# Patient Record
Sex: MALE | Race: WHITE | Employment: OTHER | URBAN - METROPOLITAN AREA
[De-identification: names, ages, dates, MRNs, and addresses within clinical notes are randomized per-mention and may not be internally consistent; named-entity substitution may affect disease eponyms.]

---

## 2018-05-17 NOTE — PHARMACY-ADMISSION MEDICATION HISTORY
Medication reconciliation interview completed by pre-admitting nurse Jennifer Espana, reviewed by pharmacy. No further clarifications needed.       Prior to Admission medications    Medication Sig Last Dose Taking? Auth Provider   acetaminophen (TYLENOL) 325 MG tablet Take 2 tablets (650 mg) by mouth every 6 hours as needed for mild pain  Yes Deal, Shakir ANDUJAR MD   Lisinopril (ZESTRIL PO) Take 20 mg by mouth daily  Yes Reported, Patient   Pravastatin Sodium (PRAVACHOL PO) Take 10 mg by mouth every evening  Yes Reported, Patient

## 2018-05-21 ENCOUNTER — APPOINTMENT (OUTPATIENT)
Dept: GENERAL RADIOLOGY | Facility: CLINIC | Age: 83
DRG: 470 | End: 2018-05-21
Attending: ORTHOPAEDIC SURGERY
Payer: MEDICARE

## 2018-05-21 ENCOUNTER — ANESTHESIA EVENT (OUTPATIENT)
Dept: SURGERY | Facility: CLINIC | Age: 83
DRG: 470 | End: 2018-05-21
Payer: MEDICARE

## 2018-05-21 ENCOUNTER — ANESTHESIA (OUTPATIENT)
Dept: SURGERY | Facility: CLINIC | Age: 83
DRG: 470 | End: 2018-05-21
Payer: MEDICARE

## 2018-05-21 ENCOUNTER — HOSPITAL ENCOUNTER (INPATIENT)
Facility: CLINIC | Age: 83
LOS: 3 days | Discharge: HOME HEALTH | DRG: 470 | End: 2018-05-24
Attending: ORTHOPAEDIC SURGERY | Admitting: ORTHOPAEDIC SURGERY
Payer: MEDICARE

## 2018-05-21 DIAGNOSIS — Z96.641 STATUS POST TOTAL REPLACEMENT OF RIGHT HIP: Primary | ICD-10-CM

## 2018-05-21 DIAGNOSIS — R06.6 HICCUPS: ICD-10-CM

## 2018-05-21 PROBLEM — Z96.649 S/P TOTAL HIP ARTHROPLASTY: Status: ACTIVE | Noted: 2018-05-21

## 2018-05-21 LAB
ABO + RH BLD: NORMAL
ABO + RH BLD: NORMAL
BLD GP AB SCN SERPL QL: NORMAL
BLOOD BANK CMNT PATIENT-IMP: NORMAL
CREAT SERPL-MCNC: 1.38 MG/DL (ref 0.66–1.25)
GFR SERPL CREATININE-BSD FRML MDRD: 49 ML/MIN/1.7M2
HGB BLD-MCNC: 13.4 G/DL (ref 13.3–17.7)
POTASSIUM SERPL-SCNC: 4.4 MMOL/L (ref 3.4–5.3)
SPECIMEN EXP DATE BLD: NORMAL

## 2018-05-21 PROCEDURE — 27210995 ZZH RX 272: Performed by: ORTHOPAEDIC SURGERY

## 2018-05-21 PROCEDURE — 85018 HEMOGLOBIN: CPT | Performed by: ANESTHESIOLOGY

## 2018-05-21 PROCEDURE — 25000128 H RX IP 250 OP 636: Performed by: ANESTHESIOLOGY

## 2018-05-21 PROCEDURE — 71000012 ZZH RECOVERY PHASE 1 LEVEL 1 FIRST HR: Performed by: ORTHOPAEDIC SURGERY

## 2018-05-21 PROCEDURE — 25000128 H RX IP 250 OP 636: Performed by: NURSE ANESTHETIST, CERTIFIED REGISTERED

## 2018-05-21 PROCEDURE — 25000132 ZZH RX MED GY IP 250 OP 250 PS 637: Mod: GY | Performed by: ORTHOPAEDIC SURGERY

## 2018-05-21 PROCEDURE — 25000125 ZZHC RX 250: Performed by: ORTHOPAEDIC SURGERY

## 2018-05-21 PROCEDURE — 36000093 ZZH SURGERY LEVEL 4 1ST 30 MIN: Performed by: ORTHOPAEDIC SURGERY

## 2018-05-21 PROCEDURE — 37000008 ZZH ANESTHESIA TECHNICAL FEE, 1ST 30 MIN: Performed by: ORTHOPAEDIC SURGERY

## 2018-05-21 PROCEDURE — C1776 JOINT DEVICE (IMPLANTABLE): HCPCS | Performed by: ORTHOPAEDIC SURGERY

## 2018-05-21 PROCEDURE — 40000985 XR PELVIS AD HIP PORTABLE RIGHT 1 VIEW

## 2018-05-21 PROCEDURE — 27210794 ZZH OR GENERAL SUPPLY STERILE: Performed by: ORTHOPAEDIC SURGERY

## 2018-05-21 PROCEDURE — 25800025 ZZH RX 258: Performed by: ORTHOPAEDIC SURGERY

## 2018-05-21 PROCEDURE — 82565 ASSAY OF CREATININE: CPT | Performed by: ANESTHESIOLOGY

## 2018-05-21 PROCEDURE — 40000306 ZZH STATISTIC PRE PROC ASSESS II: Performed by: ORTHOPAEDIC SURGERY

## 2018-05-21 PROCEDURE — 86900 BLOOD TYPING SEROLOGIC ABO: CPT | Performed by: PHYSICIAN ASSISTANT

## 2018-05-21 PROCEDURE — 37000009 ZZH ANESTHESIA TECHNICAL FEE, EACH ADDTL 15 MIN: Performed by: ORTHOPAEDIC SURGERY

## 2018-05-21 PROCEDURE — 12000007 ZZH R&B INTERMEDIATE

## 2018-05-21 PROCEDURE — 36000063 ZZH SURGERY LEVEL 4 EA 15 ADDTL MIN: Performed by: ORTHOPAEDIC SURGERY

## 2018-05-21 PROCEDURE — 86850 RBC ANTIBODY SCREEN: CPT | Performed by: PHYSICIAN ASSISTANT

## 2018-05-21 PROCEDURE — 25000125 ZZHC RX 250: Performed by: NURSE ANESTHETIST, CERTIFIED REGISTERED

## 2018-05-21 PROCEDURE — 84132 ASSAY OF SERUM POTASSIUM: CPT | Performed by: ANESTHESIOLOGY

## 2018-05-21 PROCEDURE — 36415 COLL VENOUS BLD VENIPUNCTURE: CPT | Performed by: PHYSICIAN ASSISTANT

## 2018-05-21 PROCEDURE — A9270 NON-COVERED ITEM OR SERVICE: HCPCS | Mod: GY | Performed by: ORTHOPAEDIC SURGERY

## 2018-05-21 PROCEDURE — 40000985 XR HIP PORT RT 1 VW: Mod: TC

## 2018-05-21 PROCEDURE — 25000128 H RX IP 250 OP 636: Performed by: ORTHOPAEDIC SURGERY

## 2018-05-21 PROCEDURE — 25000128 H RX IP 250 OP 636: Performed by: PHYSICIAN ASSISTANT

## 2018-05-21 PROCEDURE — C1713 ANCHOR/SCREW BN/BN,TIS/BN: HCPCS | Performed by: ORTHOPAEDIC SURGERY

## 2018-05-21 PROCEDURE — 86901 BLOOD TYPING SEROLOGIC RH(D): CPT | Performed by: PHYSICIAN ASSISTANT

## 2018-05-21 PROCEDURE — 0SR9029 REPLACEMENT OF RIGHT HIP JOINT WITH METAL ON POLYETHYLENE SYNTHETIC SUBSTITUTE, CEMENTED, OPEN APPROACH: ICD-10-PCS | Performed by: ORTHOPAEDIC SURGERY

## 2018-05-21 DEVICE — IMP SHELL ACETABULUM HOWM 58MM 542-11-58G: Type: IMPLANTABLE DEVICE | Site: HIP | Status: FUNCTIONAL

## 2018-05-21 DEVICE — IMPLANTABLE DEVICE: Type: IMPLANTABLE DEVICE | Site: HIP | Status: FUNCTIONAL

## 2018-05-21 DEVICE — IMP ANCHOR ARTHREX CORKSCREW 6.5MM AR-1925SF: Type: IMPLANTABLE DEVICE | Site: HIP | Status: FUNCTIONAL

## 2018-05-21 DEVICE — IMP SCR BONE STRK TORX 6.5X25MM CAN 2030-6525-1: Type: IMPLANTABLE DEVICE | Site: HIP | Status: FUNCTIONAL

## 2018-05-21 RX ORDER — FENTANYL CITRATE 50 UG/ML
INJECTION, SOLUTION INTRAMUSCULAR; INTRAVENOUS PRN
Status: DISCONTINUED | OUTPATIENT
Start: 2018-05-21 | End: 2018-05-21

## 2018-05-21 RX ORDER — PROPOFOL 10 MG/ML
INJECTION, EMULSION INTRAVENOUS CONTINUOUS PRN
Status: DISCONTINUED | OUTPATIENT
Start: 2018-05-21 | End: 2018-05-21

## 2018-05-21 RX ORDER — ACETAMINOPHEN 325 MG/1
650 TABLET ORAL EVERY 4 HOURS PRN
Status: DISCONTINUED | OUTPATIENT
Start: 2018-05-24 | End: 2018-05-24 | Stop reason: HOSPADM

## 2018-05-21 RX ORDER — FENTANYL CITRATE 50 UG/ML
25-50 INJECTION, SOLUTION INTRAMUSCULAR; INTRAVENOUS
Status: DISCONTINUED | OUTPATIENT
Start: 2018-05-21 | End: 2018-05-21 | Stop reason: HOSPADM

## 2018-05-21 RX ORDER — HYDROMORPHONE HYDROCHLORIDE 1 MG/ML
.3-.5 INJECTION, SOLUTION INTRAMUSCULAR; INTRAVENOUS; SUBCUTANEOUS EVERY 10 MIN PRN
Status: DISCONTINUED | OUTPATIENT
Start: 2018-05-21 | End: 2018-05-21 | Stop reason: HOSPADM

## 2018-05-21 RX ORDER — ACETAMINOPHEN 325 MG/1
975 TABLET ORAL EVERY 8 HOURS
Status: COMPLETED | OUTPATIENT
Start: 2018-05-21 | End: 2018-05-24

## 2018-05-21 RX ORDER — ONDANSETRON 2 MG/ML
4 INJECTION INTRAMUSCULAR; INTRAVENOUS EVERY 30 MIN PRN
Status: DISCONTINUED | OUTPATIENT
Start: 2018-05-21 | End: 2018-05-21 | Stop reason: HOSPADM

## 2018-05-21 RX ORDER — HYDROMORPHONE HYDROCHLORIDE 1 MG/ML
0.2 INJECTION, SOLUTION INTRAMUSCULAR; INTRAVENOUS; SUBCUTANEOUS
Status: DISCONTINUED | OUTPATIENT
Start: 2018-05-21 | End: 2018-05-24 | Stop reason: HOSPADM

## 2018-05-21 RX ORDER — ONDANSETRON 2 MG/ML
INJECTION INTRAMUSCULAR; INTRAVENOUS PRN
Status: DISCONTINUED | OUTPATIENT
Start: 2018-05-21 | End: 2018-05-21

## 2018-05-21 RX ORDER — LIDOCAINE 40 MG/G
CREAM TOPICAL
Status: DISCONTINUED | OUTPATIENT
Start: 2018-05-21 | End: 2018-05-21 | Stop reason: HOSPADM

## 2018-05-21 RX ORDER — ONDANSETRON 4 MG/1
4 TABLET, ORALLY DISINTEGRATING ORAL EVERY 30 MIN PRN
Status: DISCONTINUED | OUTPATIENT
Start: 2018-05-21 | End: 2018-05-21 | Stop reason: HOSPADM

## 2018-05-21 RX ORDER — AMOXICILLIN 250 MG
2 CAPSULE ORAL 2 TIMES DAILY
Status: DISCONTINUED | OUTPATIENT
Start: 2018-05-21 | End: 2018-05-24 | Stop reason: HOSPADM

## 2018-05-21 RX ORDER — ONDANSETRON 2 MG/ML
4 INJECTION INTRAMUSCULAR; INTRAVENOUS EVERY 6 HOURS PRN
Status: DISCONTINUED | OUTPATIENT
Start: 2018-05-21 | End: 2018-05-24 | Stop reason: HOSPADM

## 2018-05-21 RX ORDER — HYDROXYZINE HYDROCHLORIDE 10 MG/1
10 TABLET, FILM COATED ORAL EVERY 6 HOURS PRN
Status: DISCONTINUED | OUTPATIENT
Start: 2018-05-21 | End: 2018-05-24 | Stop reason: HOSPADM

## 2018-05-21 RX ORDER — FENTANYL CITRATE 50 UG/ML
25-50 INJECTION, SOLUTION INTRAMUSCULAR; INTRAVENOUS EVERY 5 MIN PRN
Status: DISCONTINUED | OUTPATIENT
Start: 2018-05-21 | End: 2018-05-21 | Stop reason: HOSPADM

## 2018-05-21 RX ORDER — METOPROLOL TARTRATE 1 MG/ML
1-2 INJECTION, SOLUTION INTRAVENOUS EVERY 5 MIN PRN
Status: DISCONTINUED | OUTPATIENT
Start: 2018-05-21 | End: 2018-05-21 | Stop reason: HOSPADM

## 2018-05-21 RX ORDER — NALOXONE HYDROCHLORIDE 0.4 MG/ML
.1-.4 INJECTION, SOLUTION INTRAMUSCULAR; INTRAVENOUS; SUBCUTANEOUS
Status: DISCONTINUED | OUTPATIENT
Start: 2018-05-21 | End: 2018-05-24 | Stop reason: HOSPADM

## 2018-05-21 RX ORDER — ALBUTEROL SULFATE 0.83 MG/ML
2.5 SOLUTION RESPIRATORY (INHALATION) EVERY 4 HOURS PRN
Status: DISCONTINUED | OUTPATIENT
Start: 2018-05-21 | End: 2018-05-21 | Stop reason: HOSPADM

## 2018-05-21 RX ORDER — AMOXICILLIN 250 MG
1 CAPSULE ORAL 2 TIMES DAILY
Status: DISCONTINUED | OUTPATIENT
Start: 2018-05-21 | End: 2018-05-24 | Stop reason: HOSPADM

## 2018-05-21 RX ORDER — CEFAZOLIN SODIUM 2 G/100ML
2 INJECTION, SOLUTION INTRAVENOUS
Status: COMPLETED | OUTPATIENT
Start: 2018-05-21 | End: 2018-05-21

## 2018-05-21 RX ORDER — BUPIVACAINE HYDROCHLORIDE AND EPINEPHRINE 2.5; 5 MG/ML; UG/ML
30 INJECTION, SOLUTION EPIDURAL; INFILTRATION; INTRACAUDAL; PERINEURAL ONCE
Status: DISCONTINUED | OUTPATIENT
Start: 2018-05-21 | End: 2018-05-21 | Stop reason: HOSPADM

## 2018-05-21 RX ORDER — LIDOCAINE 40 MG/G
CREAM TOPICAL
Status: DISCONTINUED | OUTPATIENT
Start: 2018-05-21 | End: 2018-05-24 | Stop reason: HOSPADM

## 2018-05-21 RX ORDER — NALOXONE HYDROCHLORIDE 0.4 MG/ML
.1-.4 INJECTION, SOLUTION INTRAMUSCULAR; INTRAVENOUS; SUBCUTANEOUS
Status: DISCONTINUED | OUTPATIENT
Start: 2018-05-21 | End: 2018-05-21 | Stop reason: HOSPADM

## 2018-05-21 RX ORDER — HYDRALAZINE HYDROCHLORIDE 20 MG/ML
2.5-5 INJECTION INTRAMUSCULAR; INTRAVENOUS EVERY 10 MIN PRN
Status: DISCONTINUED | OUTPATIENT
Start: 2018-05-21 | End: 2018-05-21 | Stop reason: HOSPADM

## 2018-05-21 RX ORDER — SODIUM CHLORIDE, SODIUM LACTATE, POTASSIUM CHLORIDE, CALCIUM CHLORIDE 600; 310; 30; 20 MG/100ML; MG/100ML; MG/100ML; MG/100ML
INJECTION, SOLUTION INTRAVENOUS CONTINUOUS
Status: DISCONTINUED | OUTPATIENT
Start: 2018-05-21 | End: 2018-05-23

## 2018-05-21 RX ORDER — ONDANSETRON 4 MG/1
4 TABLET, ORALLY DISINTEGRATING ORAL EVERY 6 HOURS PRN
Status: DISCONTINUED | OUTPATIENT
Start: 2018-05-21 | End: 2018-05-24 | Stop reason: HOSPADM

## 2018-05-21 RX ORDER — MEPERIDINE HYDROCHLORIDE 50 MG/ML
12.5 INJECTION INTRAMUSCULAR; INTRAVENOUS; SUBCUTANEOUS
Status: DISCONTINUED | OUTPATIENT
Start: 2018-05-21 | End: 2018-05-21 | Stop reason: HOSPADM

## 2018-05-21 RX ORDER — SODIUM CHLORIDE, SODIUM LACTATE, POTASSIUM CHLORIDE, CALCIUM CHLORIDE 600; 310; 30; 20 MG/100ML; MG/100ML; MG/100ML; MG/100ML
INJECTION, SOLUTION INTRAVENOUS CONTINUOUS
Status: DISCONTINUED | OUTPATIENT
Start: 2018-05-21 | End: 2018-05-21 | Stop reason: HOSPADM

## 2018-05-21 RX ORDER — CEFAZOLIN SODIUM 1 G/3ML
1 INJECTION, POWDER, FOR SOLUTION INTRAMUSCULAR; INTRAVENOUS SEE ADMIN INSTRUCTIONS
Status: DISCONTINUED | OUTPATIENT
Start: 2018-05-21 | End: 2018-05-21 | Stop reason: HOSPADM

## 2018-05-21 RX ORDER — EPHEDRINE SULFATE 50 MG/ML
INJECTION, SOLUTION INTRAMUSCULAR; INTRAVENOUS; SUBCUTANEOUS PRN
Status: DISCONTINUED | OUTPATIENT
Start: 2018-05-21 | End: 2018-05-21

## 2018-05-21 RX ORDER — OXYCODONE HYDROCHLORIDE 5 MG/1
5 TABLET ORAL EVERY 4 HOURS PRN
Status: DISCONTINUED | OUTPATIENT
Start: 2018-05-21 | End: 2018-05-21

## 2018-05-21 RX ORDER — CEFAZOLIN SODIUM 2 G/100ML
2 INJECTION, SOLUTION INTRAVENOUS EVERY 8 HOURS
Status: COMPLETED | OUTPATIENT
Start: 2018-05-21 | End: 2018-05-22

## 2018-05-21 RX ORDER — OXYCODONE HYDROCHLORIDE 5 MG/1
5 TABLET ORAL
Status: DISCONTINUED | OUTPATIENT
Start: 2018-05-21 | End: 2018-05-24 | Stop reason: HOSPADM

## 2018-05-21 RX ADMIN — ONDANSETRON 4 MG: 2 INJECTION INTRAMUSCULAR; INTRAVENOUS at 14:10

## 2018-05-21 RX ADMIN — SODIUM CHLORIDE, POTASSIUM CHLORIDE, SODIUM LACTATE AND CALCIUM CHLORIDE: 600; 310; 30; 20 INJECTION, SOLUTION INTRAVENOUS at 14:51

## 2018-05-21 RX ADMIN — PHENYLEPHRINE HYDROCHLORIDE 100 MCG: 10 INJECTION, SOLUTION INTRAMUSCULAR; INTRAVENOUS; SUBCUTANEOUS at 14:31

## 2018-05-21 RX ADMIN — MIDAZOLAM 2 MG: 1 INJECTION INTRAMUSCULAR; INTRAVENOUS at 13:34

## 2018-05-21 RX ADMIN — SODIUM CHLORIDE, POTASSIUM CHLORIDE, SODIUM LACTATE AND CALCIUM CHLORIDE: 600; 310; 30; 20 INJECTION, SOLUTION INTRAVENOUS at 14:04

## 2018-05-21 RX ADMIN — PHENYLEPHRINE HYDROCHLORIDE 200 MCG: 10 INJECTION, SOLUTION INTRAMUSCULAR; INTRAVENOUS; SUBCUTANEOUS at 14:37

## 2018-05-21 RX ADMIN — PHENYLEPHRINE HYDROCHLORIDE 100 MCG: 10 INJECTION, SOLUTION INTRAMUSCULAR; INTRAVENOUS; SUBCUTANEOUS at 15:04

## 2018-05-21 RX ADMIN — CEFAZOLIN SODIUM 2 G: 2 INJECTION, SOLUTION INTRAVENOUS at 14:04

## 2018-05-21 RX ADMIN — Medication 10 MG: at 14:56

## 2018-05-21 RX ADMIN — SODIUM CHLORIDE, POTASSIUM CHLORIDE, SODIUM LACTATE AND CALCIUM CHLORIDE: 600; 310; 30; 20 INJECTION, SOLUTION INTRAVENOUS at 15:55

## 2018-05-21 RX ADMIN — VASOPRESSIN 2 UNITS: 20 INJECTION INTRAMUSCULAR; SUBCUTANEOUS at 15:28

## 2018-05-21 RX ADMIN — PHENYLEPHRINE HYDROCHLORIDE 100 MCG: 10 INJECTION, SOLUTION INTRAMUSCULAR; INTRAVENOUS; SUBCUTANEOUS at 14:48

## 2018-05-21 RX ADMIN — PHENYLEPHRINE HYDROCHLORIDE 200 MCG: 10 INJECTION, SOLUTION INTRAMUSCULAR; INTRAVENOUS; SUBCUTANEOUS at 14:56

## 2018-05-21 RX ADMIN — VASOPRESSIN 2 UNITS: 20 INJECTION INTRAMUSCULAR; SUBCUTANEOUS at 15:21

## 2018-05-21 RX ADMIN — Medication 5 MG: at 14:47

## 2018-05-21 RX ADMIN — SENNOSIDES AND DOCUSATE SODIUM 1 TABLET: 8.6; 5 TABLET ORAL at 19:30

## 2018-05-21 RX ADMIN — PHENYLEPHRINE HYDROCHLORIDE 100 MCG: 10 INJECTION, SOLUTION INTRAMUSCULAR; INTRAVENOUS; SUBCUTANEOUS at 14:23

## 2018-05-21 RX ADMIN — CEFAZOLIN SODIUM 2 G: 2 INJECTION, SOLUTION INTRAVENOUS at 22:26

## 2018-05-21 RX ADMIN — PHENYLEPHRINE HYDROCHLORIDE 100 MCG: 10 INJECTION, SOLUTION INTRAMUSCULAR; INTRAVENOUS; SUBCUTANEOUS at 15:28

## 2018-05-21 RX ADMIN — VASOPRESSIN 1 UNITS: 20 INJECTION INTRAMUSCULAR; SUBCUTANEOUS at 16:07

## 2018-05-21 RX ADMIN — CEFAZOLIN 1 G: 1 INJECTION, POWDER, FOR SOLUTION INTRAMUSCULAR; INTRAVENOUS at 16:02

## 2018-05-21 RX ADMIN — ACETAMINOPHEN 975 MG: 325 TABLET ORAL at 19:30

## 2018-05-21 RX ADMIN — FENTANYL CITRATE 100 MCG: 50 INJECTION, SOLUTION INTRAMUSCULAR; INTRAVENOUS at 13:34

## 2018-05-21 RX ADMIN — VASOPRESSIN 1 UNITS: 20 INJECTION INTRAMUSCULAR; SUBCUTANEOUS at 15:07

## 2018-05-21 RX ADMIN — SODIUM CHLORIDE, POTASSIUM CHLORIDE, SODIUM LACTATE AND CALCIUM CHLORIDE: 600; 310; 30; 20 INJECTION, SOLUTION INTRAVENOUS at 19:31

## 2018-05-21 RX ADMIN — VASOPRESSIN 2 UNITS: 20 INJECTION INTRAMUSCULAR; SUBCUTANEOUS at 15:39

## 2018-05-21 RX ADMIN — PROPOFOL 75 MCG/KG/MIN: 10 INJECTION, EMULSION INTRAVENOUS at 14:11

## 2018-05-21 RX ADMIN — OXYCODONE HYDROCHLORIDE 5 MG: 5 TABLET ORAL at 20:52

## 2018-05-21 RX ADMIN — PHENYLEPHRINE HYDROCHLORIDE 100 MCG: 10 INJECTION, SOLUTION INTRAMUSCULAR; INTRAVENOUS; SUBCUTANEOUS at 14:19

## 2018-05-21 RX ADMIN — Medication 10 MG: at 14:31

## 2018-05-21 ASSESSMENT — LIFESTYLE VARIABLES: TOBACCO_USE: 1

## 2018-05-21 NOTE — IP AVS SNAPSHOT
MRN:[id number]                      After Visit Summary   5/21/2018    Lobito David    MRN: [id number]           Thank you!     Thank you for choosing [facility] for your care. Our goal is always to provide you with excellent care. Hearing back from our patients is one way we can continue to improve our services. Please take a few minutes to complete the written survey that you may receive in the mail after you visit. If you would like to speak to someone directly about your visit please contact Patient Relations at [phone]. Thank you!          Patient Information     Date Of Birth          1935        Designated Caregiver       Most Recent Value    Caregiver    Will someone help with your care after discharge? yes    Name of designated caregiver Jennifer David    Phone number of caregiver [phone]    Caregiver address [place]      About your hospital stay     You were admitted on:  May 21, 2018 You last received care in the:  [facility]    You were discharged on:  May 24, 2018        Reason for your hospital stay       Right total hip replacement.                  Who to Call     For medical emergencies, please call 911.  For non-urgent questions about your medical care, please call your primary care provider or clinic, [phone]  For questions related to your surgery, please call your surgery clinic        Attending Provider     Provider Specialty    Dale Mcghee MD Orthopedics       Primary Care Provider Office Phone # Fax #    Mario Alberto Wang [phone]      After Care Instructions     Activity       Your activity upon discharge: ambulate in house with walker. No active abduction of the hip.            Diet       Follow this diet upon discharge: Regular            Wound care and dressings       Instructions to care for your wound at home: keep wound clean and dry. Keep dressing in place until follow-up office visit.                   Follow-up Appointments     Follow-up and recommended labs and tests        Follow-up with Fortunato Hogan PA-C in 2 weeks.                  Additional Services     Home Care OT Referral for Hospital Discharge       OT to eval and treat    Your provider has ordered home care - occupational therapy. If you have not been contacted within 2 days of your discharge please call the department phone number listed on the top of this document.            Home Care PT Referral for Hospital Discharge       PT to eval and treat    Your provider has ordered home care - physical therapy. If you have not been contacted within 2 days of your discharge please call the department phone number listed on the top of this document.            Home care nursing referral       RN skilled nursing visit. RN to assess vital signs and weight.    Your provider has ordered home care nursing services. If you have not been contacted within 2 days of your discharge please call the inpatient department phone number at [phone] .                  Further instructions from your care team       Your home care referral was sent to [facility].  If you haven't heard from them within the next 24-48 hours,  Please call them @ [phone]     Note:  You may not go to therapy appointments at the physical therapy building until home care has concluded.  PT will come to your home.            Return to clinic in10-14 days. Call [phone] to schedule or if you experience any problems before your scheduled appointment.      See general discharge instruction sheet for hips.  1. Do exercises as instructed by therapist.  2. Observe your hip precautions.  3. Walk daily increasing distance and time each day by a little.  4. Ice hip for swelling and discomfort.  5. May shower, inspect dressing daily for problems listed below   6.Notify your dentist or physician of your implant so you can get antibiotics before any dental work or any other invasive  "procedure (ie: colonoscopy).  7. Do not cross legs.      Aquacel dressing:  DO NOT CHANGE DRESSING DAILY.  Leave this dressing on until follow-up appointment with Orthopedic Surgeon.  Dressing is waterproof, can shower with it on, pat dry when done.  No soaking such as in tub baths, pools or hot tubs        While on narcotic pain medication, to prevent constipation:  1. Drink plenty of water to keep well hydrated   2. May take over the counter Colace or Senna (follow instructions on label)       Call your physician if: ([phone])   1. Persistent fever greater than 100 degrees with body chills or excessive sweating.  2. Increased/persistent redness, localized warmth, tenderness, drainage or swelling at incision site. Greater than 50% drainage on dressing.   3. Pain not controlled with oral pain medications, ice and rest.   4. No bowel movement in 3 days (may use Milk of Magnesia or other over the counter remedy).  5. Chest pain, shortness of breath, and/or calf pain with excessive swelling.  6. Generalized feeling of illness.  7. Any other questions or concerns related to your surgery/recovery.        Thank you for allowing [facility] to participate in your cares!!         Pending Results     No orders found from 5/19/2018 to 5/22/2018.            Statement of Approval     Ordered          05/22/18 0858  I have reviewed and agree with all the recommendations and orders detailed in this document.  EFFECTIVE NOW     Approved and electronically signed by:  Yefri Hogan PA             Admission Information     Date & Time Provider Department Dept. Phone    5/21/2018 Dale Mcghee MD [facility] [phone]      Your Vitals Were     Blood Pressure Pulse Temperature Respirations Height Weight    113/55 (BP Location: Left arm) 85 98.3  F (36.8  C) (Oral) 18 1.753 m (5' 9\") 86.2 kg (190 lb)    Pulse Oximetry BMI (Body Mass Index)                93% 28.06 kg/m2        " "  MyChart Information     [employer] lets you send messages to your doctor, view your test results, renew your prescriptions, schedule appointments and more. To sign up, go to [email or web address] . Click on \"Log in\" on the left side of the screen, which will take you to the Welcome page. Then click on \"Sign up Now\" on the right side of the page.     You will be asked to enter the access code listed below, as well as some personal information. Please follow the directions to create your username and password.     Your access code is: [email or web address]  Expires: 2018  8:22 AM     Your access code will  in 90 days. If you need help or a new code, please call your [place] clinic or [phone].        Care EveryWhere ID     This is your Care EveryWhere ID. This could be used by other organizations to access your [place] medical records  [id number]        Equal Access to Services     YVETTE LONG AH: Susan barraza Sofrancisca, watwilada hair, qaybta kaalmanora adecristela, penny vernon . So [facility] [phone].    ATENCIÓN: Si jazmine fink, tiene a sosa disposición servicios gratuitos de asistencia lingüística. Llame al [phone].    We comply with applicable federal civil rights laws and [place] laws. We do not discriminate on the basis of race, color, national origin, age, disability, sex, sexual orientation, or gender identity.               Review of your medicines      START taking        Dose / Directions    chlorproMAZINE 25 MG tablet   Commonly known as:  THORAZINE   Used for:  Hiccups        Dose:  25-50 mg   Take 1-2 tablets (25-50 mg) by mouth 3 times daily as needed (hiccups)   Quantity:  15 tablet   Refills:  0       oxyCODONE IR 5 MG tablet   Commonly known as:  ROXICODONE        Dose:  5 mg   Take 1 tablet (5 mg) by mouth every 3 hours as needed for other (pain control or improvement in physical function. Hold dose for analgesic side effects.)   Quantity:  60 tablet "   Refills:  0       rivaroxaban ANTICOAGULANT 10 MG Tabs tablet   Commonly known as:  XARELTO        Dose:  10 mg   Take 1 tablet (10 mg) by mouth daily   Quantity:  30 tablet   Refills:  0         CONTINUE these medicines which have NOT CHANGED        Dose / Directions    acetaminophen 325 MG tablet   Commonly known as:  TYLENOL   Used for:  S/P lumbar fusion        Dose:  650 mg   Take 2 tablets (650 mg) by mouth every 6 hours as needed for mild pain   Quantity:  40 tablet   Refills:  0       PRAVACHOL PO        Dose:  10 mg   Take 10 mg by mouth every evening   Refills:  0       ZESTRIL PO        Dose:  20 mg   Take 20 mg by mouth daily   Refills:  0            Where to get your medicines      These medications were sent to [place], [place] - [address] [facility]  [address] [facility] [address]     Phone:  [phone]     chlorproMAZINE 25 MG tablet         Some of these will need a paper prescription and others can be bought over the counter. Ask your nurse if you have questions.     Bring a paper prescription for each of these medications     oxyCODONE IR 5 MG tablet    rivaroxaban ANTICOAGULANT 10 MG Tabs tablet                Protect others around you: Learn how to safely use, store and throw away your medicines at www.disposemymeds.org.        Information about OPIOIDS     PRESCRIPTION OPIOIDS: WHAT YOU NEED TO KNOW   You have a prescription for an opioid (narcotic) pain medicine. Opioids can cause addiction. If you have a history of chemical dependency of any type, you are at a higher risk of becoming addicted to opioids. Only take this medicine after all other options have been tried. Take it for as short a time and as few doses as possible.     Do not:    Drive. If you drive while taking these medicines, you could be arrested for driving under the influence (DUI).    Operate heavy machinery    Do any other dangerous activities while taking these medicines.      Drink any alcohol while taking these medicines.      Take with any other medicines that contain acetaminophen. Read all labels carefully. Look for the word  acetaminophen  or  Tylenol.  Ask your pharmacist if you have questions or are unsure.    Store your pills in a secure place, locked if possible. We will not replace any lost or stolen medicine. If you don t finish your medicine, please throw away (dispose) as directed by your pharmacist. The [place] Pollution Control Agency has more information about safe disposal: https://www.pca.[facility].[place].us/living-green/managing-unwanted-medications    All opioids tend to cause constipation. Drink plenty of water and eat foods that have a lot of fiber, such as fruits, vegetables, prune juice, apple juice and high-fiber cereal. Take a laxative (Miralax, milk of magnesia, Colace, Senna) if you don t move your bowels at least every other day.              Medication List: This is a list of all your medications and when to take them. Check marks below indicate your daily home schedule. Keep this list as a reference.      Medications           Morning Afternoon Evening Bedtime As Needed    acetaminophen 325 MG tablet   Commonly known as:  TYLENOL   Take 2 tablets (650 mg) by mouth every 6 hours as needed for mild pain   Last time this was given:  975 mg on 5/24/2018 10:18 AM                                   chlorproMAZINE 25 MG tablet   Commonly known as:  THORAZINE   Take 1-2 tablets (25-50 mg) by mouth 3 times daily as needed (hiccups)   Last time this was given:  25 mg on 5/23/2018  8:53 PM                                   oxyCODONE IR 5 MG tablet   Commonly known as:  ROXICODONE   Take 1 tablet (5 mg) by mouth every 3 hours as needed for other (pain control or improvement in physical function. Hold dose for analgesic side effects.)   Last time this was given:  5 mg on 5/24/2018 11:49 AM                                   PRAVACHOL PO   Take 10 mg by mouth every evening                                    rivaroxaban ANTICOAGULANT 10 MG Tabs tablet   Commonly known as:  XARELTO   Take 1 tablet (10 mg) by mouth daily   Last time this was given:  10 mg on 5/24/2018  8:25 AM                                   ZESTRIL PO   Take 20 mg by mouth daily   Last time this was given:  20 mg on 5/24/2018  8:24 AM

## 2018-05-21 NOTE — BRIEF OP NOTE
[facility] Brief Operative Note    Pre-operative diagnosis: DJD   Post-operative diagnosis same   Procedure: Procedure(s):  Right Total Hip Arthroplasty with Antonio instrumentation   - Wound Class: I-Clean   Surgeon(s): Surgeon(s) and Role:     * Dale Mcghee MD - Primary     * Yefri Hogan PA   Estimated blood loss: 200 mL    Specimens: * No specimens in log *   Findings: Djd, abductor tear

## 2018-05-21 NOTE — ANESTHESIA POSTPROCEDURE EVALUATION
Patient: Lobito David    Procedure(s):  Right Total Hip Arthroplasty with Antonio instrumentation   - Wound Class: I-Clean    Diagnosis:DJD  Diagnosis Additional Information: Pre-operative diagnosis: DJD  Post-operative diagnosis same  Procedure: Procedure(s):  Right Total Hip Arthroplasty with [place] instrumentation   - Wound Class: I-Clean  Surgeon(s): Surgeon(s) and Role:     * Dale Mcghee MD - Primary     * lupis Lyle, YOBANI Regalado  Estimated blood loss: 200 mL                  Specimens: * No specimens in log *  Findings: Djd, abductor tear         Anesthesia Type:  Spinal    Note:  Anesthesia Post Evaluation    Patient location during evaluation: PACU  Patient participation: Able to fully participate in evaluation  Level of consciousness: awake  Pain management: adequate  Airway patency: patent  Cardiovascular status: acceptable  Respiratory status: acceptable  Hydration status: acceptable  PONV: none     Anesthetic complications: None          Last vitals:  Vitals:    05/21/18 1710 05/21/18 1714 05/21/18 1715   BP: 102/63  105/65   Pulse:      Resp: 17 19 15   Temp:      SpO2: 93% 95% 94%         Electronically Signed By: Frank Orellana MD  May 21, 2018  5:27 PM

## 2018-05-21 NOTE — ANESTHESIA PROCEDURE NOTES
Peripheral nerve/Neuraxial procedure note : intrathecal  Pre-Procedure  Performed by TINO LAO  Referred by JAZZ DELVALLE  Location: pre-op    Procedure Times:5/21/2018 1:34 AM and 5/21/2018 1:38 AM  Pre-Anesthestic Checklist: patient identified, IV checked, risks and benefits discussed, informed consent, monitors and equipment checked, pre-op evaluation and at physician/surgeon's request    Timeout  Correct Patient: Yes   Correct Procedure: Yes   Correct Site: Yes   Correct Laterality: N/A   Correct Position: Yes   Site Marked: N/A   .   Procedure Documentation  ASA 3  Diagnosis:Right hip djd.    Procedure:    Intrathecal.  Insertion Site:L3-4  (midline approach)      Patient Prep;mask, sterile gloves, povidone-iodine 7.5% surgical scrub.  .  Needle: Claudia tip Spinal Needle (gauge): 25  Spinal/LP Needle Length (inches): 3.5 # of attempts: 1 and # of redirects:  Introducer used .       Assessment/Narrative  Paresthesias: No.  .  .  clear CSF fluid removed . Comments:  15 mg bupivicaine, 100 mcg epinephrine

## 2018-05-21 NOTE — LETTER
Transition Communication Hand-off for Care Transitions to Next Level of Care Provider    Name: Lobito David  : 1935  MRN #: [id number]  Primary Care Provider: Mario Alberto Wang  Primary Care MD Name: Elizabeth   Primary Clinic: [place] [address] [facility] [address]  [place] [address]  Primary Care Clinic Name:  ([facility] )  Reason for Hospitalization:  DJD  S/P total hip arthroplasty  Admit Date/Time: 2018 12:40 PM  Discharge Date: 2018  Payor Source: Payor: [employer] / Plan: 51 Auto PLAN / Product Type: HMO /   Readmission Assessment Measure (SUGEY) Risk Score/category: LOW   Patient will receive the following: HOME CARE  [facility] RN/PT/OT     Key Recommendations:  Pt wife and pt have been through surgical recovery before with wife the main support and assist while pt has recovered from previous hip surgery and a back surgery   Pt was actually doing well with therapies but there was some hesitation and at one point TCU was recommended. Wife came in and joined pt for his  PT/OT sessions and it was decided that final recommendations would be for home PT/OT RN was added as pt had experienced some low BP's Pt discharging on Xarelto.     Allison Montgomery    AVS/Discharge Summary is the source of truth; this is a helpful guide for improved communication of patient story

## 2018-05-21 NOTE — ANESTHESIA PREPROCEDURE EVALUATION
Anesthesia Evaluation     . Pt has had prior anesthetic. Type: General    No history of anesthetic complications          ROS/MED HX    ENT/Pulmonary:     (+)tobacco use, Past use , . .    Neurologic:       Cardiovascular: Comment: AAA - 3.5 cm    (+) Dyslipidemia, hypertension----. : . . . :. .       METS/Exercise Tolerance:     Hematologic:     (+) History of blood clots (PE) -      Musculoskeletal:         GI/Hepatic:         Renal/Genitourinary:         Endo:         Psychiatric:         Infectious Disease:         Malignancy:         Other:                     Physical Exam      Airway   Mallampati: III  TM distance: >3 FB  Neck ROM: full    Dental     Cardiovascular   Rhythm and rate: regular and normal      Pulmonary    breath sounds clear to auscultation                    Anesthesia Plan      History & Physical Review  History and physical reviewed and following examination; no interval change.    ASA Status:  3 .    NPO Status:  > 8 hours    Plan for Spinal Maintenance will be Balanced.    PONV prophylaxis:  Ondansetron (or other 5HT-3) and Dexamethasone or Solumedrol       Postoperative Care  Postoperative pain management:  IV analgesics, Oral pain medications, Multi-modal analgesia and Neuraxial analgesia.      Consents  Anesthetic plan, risks, benefits and alternatives discussed with:  Patient..                          .

## 2018-05-21 NOTE — PLAN OF CARE
Pt. arrived to 6th floor on hospital bed. RA. Capnography monitoring. Oriented to room, unit, and use of call light. Will continue to monitor.

## 2018-05-21 NOTE — IP AVS SNAPSHOT
[facility]    [address]    [facility] [address]    Phone:  [phone]    Fax:  [phone]                                       After Visit Summary   5/21/2018    Lobito David    MRN: [id number]           After Visit Summary Signature Page     I have received my discharge instructions, and my questions have been answered. I have discussed any challenges I see with this plan with the nurse or doctor.    ..........................................................................................................................................  Patient/Patient Representative Signature      ..........................................................................................................................................  Patient Representative Print Name and Relationship to Patient    ..................................................               ................................................  Date                                            Time    ..........................................................................................................................................  Reviewed by Signature/Title    ...................................................              ..............................................  Date                                                            Time

## 2018-05-21 NOTE — ANESTHESIA CARE TRANSFER NOTE
Patient: Lobito David    Procedure(s):  Right Total Hip Arthroplasty with Antonio instrumentation   - Wound Class: I-Clean    Diagnosis: DJD  Diagnosis Additional Information: Pre-operative diagnosis: DJD  Post-operative diagnosis same  Procedure: Procedure(s):  Right Total Hip Arthroplasty with [place] instrumentation   - Wound Class: I-Clean  Surgeon(s): Surgeon(s) and Role:     * Dale Mcghee MD - Primary     * lupis Lyle, YOBANI Regalado  Estimated blood loss: 200 mL                  Specimens: * No specimens in log *  Findings: Djd, abductor tear         Anesthesia Type:   Spinal     Note:  Airway :Room Air  Patient transferred to:PACU  Handoff Report: Identifed the Patient, Identified the Reponsible Provider, Reviewed the pertinent medical history, Discussed the surgical course, Reviewed Intra-OP anesthesia mangement and issues during anesthesia, Set expectations for post-procedure period and Allowed opportunity for questions and acknowledgement of understanding      Vitals: (Last set prior to Anesthesia Care Transfer)    CRNA VITALS  5/21/2018 1616 - 5/21/2018 1654      5/21/2018             Resp Rate (observed): (!)  3                Electronically Signed By: Dean Dennis Severson, APRN CRNA  May 21, 2018  4:54 PM

## 2018-05-22 ENCOUNTER — APPOINTMENT (OUTPATIENT)
Dept: PHYSICAL THERAPY | Facility: CLINIC | Age: 83
DRG: 470 | End: 2018-05-22
Attending: ORTHOPAEDIC SURGERY
Payer: MEDICARE

## 2018-05-22 LAB
GLUCOSE SERPL-MCNC: 128 MG/DL (ref 70–99)
HGB BLD-MCNC: 10.8 G/DL (ref 13.3–17.7)

## 2018-05-22 PROCEDURE — 97161 PT EVAL LOW COMPLEX 20 MIN: CPT | Mod: GP | Performed by: PHYSICAL THERAPIST

## 2018-05-22 PROCEDURE — 12000000 ZZH R&B MED SURG/OB

## 2018-05-22 PROCEDURE — A9270 NON-COVERED ITEM OR SERVICE: HCPCS | Mod: GY | Performed by: INTERNAL MEDICINE

## 2018-05-22 PROCEDURE — 25000128 H RX IP 250 OP 636: Performed by: ORTHOPAEDIC SURGERY

## 2018-05-22 PROCEDURE — 97530 THERAPEUTIC ACTIVITIES: CPT | Mod: GP | Performed by: PHYSICAL THERAPIST

## 2018-05-22 PROCEDURE — 82947 ASSAY GLUCOSE BLOOD QUANT: CPT | Performed by: ORTHOPAEDIC SURGERY

## 2018-05-22 PROCEDURE — A9270 NON-COVERED ITEM OR SERVICE: HCPCS | Mod: GY | Performed by: ORTHOPAEDIC SURGERY

## 2018-05-22 PROCEDURE — 99232 SBSQ HOSP IP/OBS MODERATE 35: CPT | Performed by: INTERNAL MEDICINE

## 2018-05-22 PROCEDURE — 40000193 ZZH STATISTIC PT WARD VISIT: Performed by: PHYSICAL THERAPIST

## 2018-05-22 PROCEDURE — 25000132 ZZH RX MED GY IP 250 OP 250 PS 637: Mod: GY | Performed by: ORTHOPAEDIC SURGERY

## 2018-05-22 PROCEDURE — 97110 THERAPEUTIC EXERCISES: CPT | Mod: GP | Performed by: PHYSICAL THERAPIST

## 2018-05-22 PROCEDURE — 97116 GAIT TRAINING THERAPY: CPT | Mod: GP | Performed by: PHYSICAL THERAPIST

## 2018-05-22 PROCEDURE — 25000132 ZZH RX MED GY IP 250 OP 250 PS 637: Mod: GY | Performed by: INTERNAL MEDICINE

## 2018-05-22 PROCEDURE — 36415 COLL VENOUS BLD VENIPUNCTURE: CPT | Performed by: ORTHOPAEDIC SURGERY

## 2018-05-22 PROCEDURE — 99207 ZZC DOWN CODE DUE TO SUBSEQUENT EXAM: CPT | Performed by: INTERNAL MEDICINE

## 2018-05-22 PROCEDURE — 85018 HEMOGLOBIN: CPT | Performed by: ORTHOPAEDIC SURGERY

## 2018-05-22 RX ORDER — OXYCODONE HYDROCHLORIDE 5 MG/1
5 TABLET ORAL
Qty: 60 TABLET | Refills: 0 | Status: SHIPPED | OUTPATIENT
Start: 2018-05-22

## 2018-05-22 RX ORDER — CALCIUM CARBONATE 500 MG/1
1000 TABLET, CHEWABLE ORAL
Status: DISCONTINUED | OUTPATIENT
Start: 2018-05-22 | End: 2018-05-24 | Stop reason: HOSPADM

## 2018-05-22 RX ORDER — LISINOPRIL 20 MG/1
20 TABLET ORAL DAILY
Status: DISCONTINUED | OUTPATIENT
Start: 2018-05-22 | End: 2018-05-24 | Stop reason: HOSPADM

## 2018-05-22 RX ADMIN — OXYCODONE HYDROCHLORIDE 5 MG: 5 TABLET ORAL at 00:13

## 2018-05-22 RX ADMIN — LISINOPRIL 20 MG: 20 TABLET ORAL at 18:03

## 2018-05-22 RX ADMIN — SODIUM CHLORIDE, POTASSIUM CHLORIDE, SODIUM LACTATE AND CALCIUM CHLORIDE: 600; 310; 30; 20 INJECTION, SOLUTION INTRAVENOUS at 01:54

## 2018-05-22 RX ADMIN — OXYCODONE HYDROCHLORIDE 5 MG: 5 TABLET ORAL at 08:58

## 2018-05-22 RX ADMIN — OXYCODONE HYDROCHLORIDE 5 MG: 5 TABLET ORAL at 06:16

## 2018-05-22 RX ADMIN — HYDROXYZINE HYDROCHLORIDE 10 MG: 10 TABLET ORAL at 08:31

## 2018-05-22 RX ADMIN — OXYCODONE HYDROCHLORIDE 5 MG: 5 TABLET ORAL at 22:18

## 2018-05-22 RX ADMIN — OXYCODONE HYDROCHLORIDE 5 MG: 5 TABLET ORAL at 03:15

## 2018-05-22 RX ADMIN — ACETAMINOPHEN 975 MG: 325 TABLET ORAL at 11:43

## 2018-05-22 RX ADMIN — OXYCODONE HYDROCHLORIDE 5 MG: 5 TABLET ORAL at 18:03

## 2018-05-22 RX ADMIN — SENNOSIDES AND DOCUSATE SODIUM 2 TABLET: 8.6; 5 TABLET ORAL at 19:37

## 2018-05-22 RX ADMIN — RIVAROXABAN 10 MG: 10 TABLET, FILM COATED ORAL at 08:58

## 2018-05-22 RX ADMIN — ACETAMINOPHEN 975 MG: 325 TABLET ORAL at 01:51

## 2018-05-22 RX ADMIN — HYDROXYZINE HYDROCHLORIDE 10 MG: 10 TABLET ORAL at 01:08

## 2018-05-22 RX ADMIN — ACETAMINOPHEN 975 MG: 325 TABLET ORAL at 18:03

## 2018-05-22 RX ADMIN — CALCIUM CARBONATE (ANTACID) CHEW TAB 500 MG 1000 MG: 500 CHEW TAB at 08:57

## 2018-05-22 RX ADMIN — CALCIUM CARBONATE (ANTACID) CHEW TAB 500 MG 1000 MG: 500 CHEW TAB at 13:38

## 2018-05-22 RX ADMIN — OXYCODONE HYDROCHLORIDE 5 MG: 5 TABLET ORAL at 11:54

## 2018-05-22 RX ADMIN — SENNOSIDES AND DOCUSATE SODIUM 1 TABLET: 8.6; 5 TABLET ORAL at 08:58

## 2018-05-22 RX ADMIN — CEFAZOLIN SODIUM 2 G: 2 INJECTION, SOLUTION INTRAVENOUS at 06:16

## 2018-05-22 NOTE — PLAN OF CARE
Problem: Patient Care Overview  Goal: Plan of Care/Patient Progress Review  A&Ox4. VSS. Ambulates Ax1 w/ Walker and GB. Palmer d/c'd this am, has voided adequately since then. +BS/gas. Dressing CDI. Taking PRN oxycodone for pain control. Hemovac in place and draining. Will continue to monitor.

## 2018-05-22 NOTE — PLAN OF CARE
Problem: Patient Care Overview  Goal: Plan of Care/Patient Progress Review  Outcome: Improving  A&Ox4. VSS. RA. Numbness to BLE from spinal block.  Dressing CDI. Capnography monitoring. Palmer catheter patent w/ adequate amt. of output. Hemovac patent. Up w/ Ax2, gait belt, walker. Able to stand up at bedside, but unable to ambulate d/t spinal block and numbness present in BLE. BS activex4, denies passing flatus, tolerating clear liquids. Pain managed w/ scheduled po Tylenol. Prn po Oxycodone and IV Diluadid also available. Will continue to monitor.

## 2018-05-22 NOTE — PROGRESS NOTES
05/22/18 0800   Quick Adds   Type of Visit Initial PT Evaluation   Living Environment   Lives With spouse   Living Arrangements house   Home Accessibility stairs to enter home   Number of Stairs to Enter Home 2   Number of Stairs Within Home 1  (1 into sunken living room, 1 platform to bedroom)   Stair Railings at Home none   Living Environment Comment single level except one platform down into living room or bedroom   Self-Care   Usual Activity Tolerance good   Current Activity Tolerance good   Regular Exercise yes   Activity/Exercise Type walking   Exercise Amount/Frequency 45 mins;daily   Equipment Currently Used at Home (owns a SEC, fww, seat built in shower)   Activity/Exercise/Self-Care Comment indep ADL/IADL   Functional Level Prior   Ambulation 0-->independent   Transferring 0-->independent   Toileting 0-->independent   Bathing 0-->independent   Dressing 0-->independent   Eating 0-->independent   Communication 0-->understands/communicates without difficulty   Swallowing 0-->swallows foods/liquids without difficulty   Cognition 0 - no cognition issues reported   Fall history within last six months no   Which of the above functional risks had a recent onset or change? ambulation   General Information   Onset of Illness/Injury or Date of Surgery - Date 05/21/18   Referring Physician Dale Mcghee   Patient/Family Goals Statement Home when able   Pertinent History of Current Problem (include personal factors and/or comorbidities that impact the POC) POD#1 s/p R LEYDA 2/2 DJD, and torn abductor muscle found introperatively   Precautions/Limitations oxygen therapy device and L/min;right hip precautions  (anterolateral approach prec.Walker x 6 weeks/no hip ABD ex)   Weight-Bearing Status - RLE weight-bearing as tolerated   General Info Comments Per ortho, due to ABD muscle tear, pt must use fww x 6 weeks, and avoid active hip ABD during healing phase in addition to Ant hip prec.    Cognitive Status Examination    Orientation orientation to person, place and time   Level of Consciousness alert   Follows Commands and Answers Questions 100% of the time;able to follow multistep instructions   Personal Safety and Judgment intact   Memory intact   Pain Assessment   Patient Currently in Pain Yes, see Vital Sign flowsheet  (7-8 at rest)   Integumentary/Edema   Integumentary/Edema Comments OP site   Range of Motion (ROM)   ROM Comment hip flex to 65 tolerated on R   Strength   Strength Comments NT , functional . Good isometric quality,   Bed Mobility   Bed Mobility Comments Cues, min A OP leg, HOB elevated, SR   Transfer Skills   Transfer Comments Min A, cues   Gait   Gait Comments On capno, so a few paces bed>chair, close CGA, cues for step sequence. Fww.    Sensory Examination   Sensory Perception Comments pt now has feeling in feet/legs.    Modality Interventions   Planned Modality Interventions Cryotherapy   General Therapy Interventions   Planned Therapy Interventions bed mobility training;gait training;ROM;strengthening;transfer training;risk factor education;home program guidelines;progressive activity/exercise   Intervention Comments precaution reminders   Clinical Impression   Criteria for Skilled Therapeutic Intervention yes, treatment indicated   PT Diagnosis difficulty with walking, post op hip aftercare   Influenced by the following impairments pain, weakness, stiffness, supplemental 02   Functional limitations due to impairments A with functional mobiltiy needed, limited gait tolerance, need for AD.   Clinical Presentation Stable/Uncomplicated   Clinical Presentation Rationale per observation, pain high all night, pain fluctuating this am   Clinical Decision Making (Complexity) Low complexity   Therapy Frequency` 2 times/day   Predicted Duration of Therapy Intervention (days/wks) 2-3 days   Anticipated Equipment Needs at Discharge (owns a fww, may have OT DME needs)   Anticipated Discharge Disposition Home with  "Assist  (spouse can supervise/very min A)   Risk & Benefits of therapy have been explained Yes   Patient, Family & other staff in agreement with plan of care Yes   [facility] TM \"6 Clicks\"   2016, Trustees of [facility], under license to [employer].  All rights reserved.   6 Clicks Short Forms Basic Mobility Inpatient Short Form   [facility] AM-PAC  \"6 Clicks\" V.2 Basic Mobility Inpatient Short Form   1. Turning from your back to your side while in a flat bed without using bedrails? 3 - A Little   2. Moving from lying on your back to sitting on the side of a flat bed without using bedrails? 3 - A Little   3. Moving to and from a bed to a chair (including a wheelchair)? 3 - A Little   4. Standing up from a chair using your arms (e.g., wheelchair, or bedside chair)? 3 - A Little   5. To walk in hospital room? 3 - A Little   6. Climbing 3-5 steps with a railing? 2 - A Lot   Basic Mobility Raw Score (Score out of 24.Lower scores equate to lower levels of function) 17   Total Evaluation Time   Total Evaluation Time (Minutes) 15     "

## 2018-05-22 NOTE — OP NOTE
Procedure Date: 05/21/2018      DATE OF SURGERY:  05/21/2018      PREOPERATIVE DIAGNOSIS:  Right hip primary osteoarthritis.      POSTOPERATIVE DIAGNOSIS:  Right hip primary osteoarthritis.      PROCEDURE:  Right total hip arthroplasty.      SURGEON:  Dale Mcghee MD      ASSISTANT:  Mihai Hogan PA-C      ANESTHESIA:  Spinal and local.      ESTIMATED BLOOD LOSS:  Less than 200 mL.      COMPLICATIONS:  None apparent.      DRAINS:  One Hemovac.       COMPONENTS:  Size 58 acetabular shell, size 5 stem, 36+0 mm metal head, Trident Crossfire 36 mm 0 degree insert.      INDICATIONS:  Kenny is an 82-year-old male who has had longstanding right hip pain.  He attempted conservative management and injections which failed to relieve his symptoms.  Based on his ongoing pain, options were reviewed.  Based on his grade 4 changes on x-ray, he elected for total hip replacement.  Risks, benefits and alternatives were discussed and consent signed.      DESCRIPTION OF PROCEDURE:  Mr. David was brought to the operating room and  placed supine on the table.  A spinal anesthesia was administered.  He was placed in lateral decubitus with his right hip up and then prepped and draped in the usual sterile fashion for a total hip replacement.  After a timeout confirming the appropriate limb, a standard lateral incision directly over the trochanter was undertaken with sharp dissection.  The IT band was identified.  This was split and East-West was placed.  The bursal tissue was excised, noting he did have some partial thickness tearing with some atrophy of his abductors.  The anterior third was then taken off and a capsulotomy was performed.  The hip was then easily dislocated.  The femoral head was osteotomized.  We placed retractors along the acetabulum and he had a large peripheral osteophyte with some spurring.  He had obvious grade 4 changes in the acetabulum.  We used a combination of a rongeur and osteotome to remove the bone spurs  from around the acetabulum.  We identified the medial wall and removed some of the soft tissue from the pulvinar.  We then began reaming.  We started with a 44.  We reamed sequentially up to a 58.  We trialled.  We were very pleased with the position as we were at about 40 degrees of inclination and about 10 degrees of anteversion.  We then used a 59 just to touch the outer rim.  We then impacted the 58 cup and it went down without any problems.  We placed two 25 mm screws to help secure the cup and we had excellent purchase.  We then irrigated, placed the final poly using the Trident Crossfire for a 36 mm head.  We were again pleased with the position of the cup so just a 0-degree insert was used.  We then turned our attention to the femur.  We began broaching, starting with a 0 and broached all the way up to a 5.  We felt we had a good fit, so we did obtain an intraoperative x-ray which showed excellent position of our cup and it looked like the size 5 was an excellent size for him.  This was impacted without difficulty.  We did do a trial and felt the +0 gave us equal leg lengths and excellent stability both anteriorly and posteriorly.  We then did a 3-minute Betadine soak, impacted the final head in position and located the hip again without difficulty, did a final irrigation.  We then closed the abductors using interrupted #1 Vicryl.  We also used a 6.5 corkscrew which was doubly loaded to reinforce our repair.  We also used a #5 Ethibond through drill holes to reinforce our repair as we were somewhat concerned about some of the atrophic abductors, which was consistent with his problem on his other left hip.  We then irrigated copiously, placed a drain, and closed in layers.  Sterile dressings were applied.  He was brought to PACU in stable condition.  Needle and sponge counts correct.      PLAN:  The patient will be on a walker for 6 weeks.  Weightbearing as tolerates.  He will be admitted for pain control.   He was given Ancef 2 grams within an hour of the procedure.  This will be discontinued within 24 hours.  He will be placed on Xarelto and pneumoboots for DVT prophylaxis.  He has had a history of a blood clot in the past so this will be extended prophylaxis.         SHAYLA SCHULZ MD             D: 2018   T: 2018   MT: NTS      Name:     GURPREET RAI   MRN:      [id number]        Account:        [id number]   :      1935           Procedure Date: 2018      Document: [id number]       cc: Mario Alberto Wang MD

## 2018-05-22 NOTE — PLAN OF CARE
"Problem: Patient Care Overview  Goal: Plan of Care/Patient Progress Review  PT: PT evaluation completed, tx initiated POD#1 s/p R LEYDA and hip Abductor muscle tear repair. Pt has hx of L hip abductor muscle tear repair a couple yrs ago. At baseline, pt lives in home w/ spouse with 2 stairs in from garage, no railing, and 2 different platform steps in room to access \" sunken\" rooms in the home. Indep  ADL/IADL at baseline. Pt walking 45 min/day for ex up until  3 weeks ago when pain began.   Discharge Planner PT   Patient plan for discharge: Home w/ spouse POD 2 or 3 pending progress  Current status: Pain 7/10 with activity. Slept poorly due to pain. Pt no longer reporting numbness in feet/legs . Precautions reviewed:Anterolateral hip precautions as well as using a fww x 6 weeks post op, and avoid active hip ABD/ ex due to hip abductor muscle repair intraoperatively. Bed mob: Min A op leg, min A at torso w/ HOB up, SR used. Transfers with min A and cues using fww. Capno on, so only a few steps taken bed>chair with fww, CGA, effortful for pt. Up to chair w/ 1 hr recommended as darin. Ice. WBAT. Plan BID thru tomorrow/am Thurs.    Barriers to return to prior living situation: none once stairs are mastered, should be able to meet all goals in next day or so.   Recommendations for discharge:  Home w/ HEP, spouse supervision as needed. Will need to use fww x 6 weeks per ortho  Rationale for recommendations: Skilled PT while hospitalized to meet all goals, and HEP to progress gait and strength so pt can be Mod I with functional mobility in the home and community setting.        Entered by: Jvoita Parker 05/22/2018 12:43 PM           "

## 2018-05-22 NOTE — PLAN OF CARE
Problem: Hip Arthroplasty (Total, Partial) (Adult)  Goal: Signs and Symptoms of Listed Potential Problems Will be Absent, Minimized or Managed (Hip Arthroplasty)  Signs and symptoms of listed potential problems will be absent, minimized or managed by discharge/transition of care (reference Hip Arthroplasty (Total, Partial) (Adult) CPG).   Outcome: No Change  A&Ox4, up Ax2 to dangle  LDA: PIV infusing, IV ancef  Vitals: 2L/NC applied, ?LEELEE  Pain: difficulty controlling, PRN atarax & oxy, sched tylenol, some relief @ 0400   CMS intact  Aquacel CDI, ice applied  Hemovac CDI  Palmer patent  Regular diet this am  Followed by Ortho  Plan: PT & OT consults, control pain  Will continue to monitor

## 2018-05-22 NOTE — PLAN OF CARE
Problem: Patient Care Overview  Goal: Plan of Care/Patient Progress Review  Discharge Planner PT   Patient plan for discharge: Home w/ spouse , HEP tomorrow pm or Thurs am pending progress.  Current status: Pain 6/10, Min/Mod A bed mobility. Min A transfers, gait with fww 150' max with vc needed for proper gait pattern. Tolerating ex. Sleepy.   Barriers to return to prior living situation: Will practice stairs tomorrow, work on bed mobility, pt should be able to make all goals by tomorrow pm or Thurs am.  Recommendations for discharge: Home w/ spouse, HEP  Rationale for recommendations: Skilled PT while hospitalized to achieve all goals, improve strength, reinforce precautions, HEP should  be sufficient upon dc. SPouse supportive and able to Supervise and provide min A PRN.        Entered by: Jovita Parker 05/22/2018 4:13 PM

## 2018-05-22 NOTE — PROGRESS NOTES
[facility]    Orthopedics Progress Note     Assessment & Plan   Lobito David is a 82 year old male who was admitted on 5/21/2018 for right LEYDA. Plan is to continue therapy, pain control, VTE prophylaxis with Xarelto. Full time walker use for ambulation and no active abduction of the hip. Anticipate discharge home in 2 days. Patient will continue Xarelto at home due to history of PE.       # Pain Assessment:  Current Pain Score 5/22/2018   Patient currently in pain? -   Pain score (0-10) 6   Pain location -   Pain descriptors -       Thaddaeus Roni Tewes    Interval History   Patient had significant pain control issues overnight, but states his pain is much better now. He is tolerating his medication regimen well.     Physical Exam   Temp: 97.2  F (36.2  C) Temp src: Oral BP: 127/71 Pulse: 78 Heart Rate: 82 Resp: 16 SpO2: 100 % O2 Device: Nasal cannula Oxygen Delivery: 2 LPM  Vitals:    05/14/18 1200 05/21/18 1253   Weight: 86.6 kg (190 lb 14.4 oz) 86.2 kg (190 lb)     Vital Signs with Ranges  Temp:  [93.7  F (34.3  C)-98.8  F (37.1  C)] 97.2  F (36.2  C)  Pulse:  [78] 78  Heart Rate:  [76-89] 82  Resp:  [9-21] 16  BP: ()/(47-94) 127/71  SpO2:  [91 %-100 %] 100 %  I/O last 3 completed shifts:  In: 5865 [P.O.:1360; I.V.:4505]  Out: 2235 [Urine:1675; Drains:360; Blood:200]    Dressing shows scant blood. Calves soft. NV intact distally.     Medications     lactated ringers 125 mL/hr at 05/22/18 0154       acetaminophen  975 mg Oral Q8H     rivaroxaban ANTICOAGULANT  10 mg Oral Daily     senna-docusate  1 tablet Oral BID    Or     senna-docusate  2 tablet Oral BID     sodium chloride (PF)  3 mL Intracatheter Q8H       Data     Recent Labs  Lab 05/22/18  0607 05/21/18  1308   HGB 10.8* 13.4   POTASSIUM  --  4.4   CR  --  1.38*   *  --

## 2018-05-22 NOTE — CONSULTS
"[facility]  Hospitalist consult note    Bernabe Bhagat MD 05/22/2018  Text Page      Reason for Stay (Diagnosis): Total hip arthroplasty         Assessment and Plan:      Summary of Stay: Lobito David is a 82 year old male admitted on 5/21/2018 after right total hip arthroplasty.  He has a previous history of hypertension, dyslipidemia, pulmonary embolus after spine surgery, spinal fusion, 3.5 cm abdominal aortic aneurysm, shoulder surgery, 2 knee surgeries and an episode of diverticulitis.    Problem List:   1. Postop right total hip arthroplasty  -Had quite a bit of pain last night but feeling better today.  -Continue physical therapy and pain control per orthopedics    2.  Hypertension-blood pressure okay, had one low reading this morning.  -Resume lisinopril with parameters    3.  Dyslipidemia  -Hold statin for now    4.  Previous history of pulmonary embolus after spine surgery  -Agree with systemic anticoagulation with Xarelto      # Pain Assessment:  Current Pain Score 5/22/2018   Patient currently in pain? yes   Pain score (0-10) 4   Pain location Hip   Pain descriptors Aching   See above      DVT Prophylaxis: Xarelto  Code Status: Full Code:            Interval History (Subjective):      Feels okay.  Pain much improved from yesterday.                  Physical Exam:      Last Vital Signs:  /76 (BP Location: Left arm)  Pulse 78  Temp 98.3  F (36.8  C) (Oral)  Resp 16  Ht 1.753 m (5' 9\")  Wt 86.2 kg (190 lb)  SpO2 96%  BMI 28.06 kg/m2      Vital signs reviewed  General:  Alert, calm, NAD  CV: regular rate and rhythm, no murmurs or rubs  Lungs:  Clear to ascultation bilaterally, normal respiratory effort  Abdomen:  Soft, nontender, nondistended, no masses, normal bowel sounds  Extremities:  No edema  Neuro: normal strength and sensation in his legs.    Psychiatric:  Mood and affect within normal limits             Medications:      All current medications were reviewed " with changes reflected in problem list.         Data:      All new lab and imaging data was reviewed.   Labs:  Hemoglobin 10.8

## 2018-05-23 ENCOUNTER — APPOINTMENT (OUTPATIENT)
Dept: PHYSICAL THERAPY | Facility: CLINIC | Age: 83
DRG: 470 | End: 2018-05-23
Attending: ORTHOPAEDIC SURGERY
Payer: MEDICARE

## 2018-05-23 ENCOUNTER — APPOINTMENT (OUTPATIENT)
Dept: OCCUPATIONAL THERAPY | Facility: CLINIC | Age: 83
DRG: 470 | End: 2018-05-23
Attending: ORTHOPAEDIC SURGERY
Payer: MEDICARE

## 2018-05-23 LAB
GLUCOSE SERPL-MCNC: 148 MG/DL (ref 70–99)
HGB BLD-MCNC: 9.7 G/DL (ref 13.3–17.7)

## 2018-05-23 PROCEDURE — 97110 THERAPEUTIC EXERCISES: CPT | Mod: GP | Performed by: PHYSICAL THERAPY ASSISTANT

## 2018-05-23 PROCEDURE — 85018 HEMOGLOBIN: CPT | Performed by: ORTHOPAEDIC SURGERY

## 2018-05-23 PROCEDURE — 97165 OT EVAL LOW COMPLEX 30 MIN: CPT | Mod: GO

## 2018-05-23 PROCEDURE — 40000193 ZZH STATISTIC PT WARD VISIT: Performed by: PHYSICAL THERAPY ASSISTANT

## 2018-05-23 PROCEDURE — 40000133 ZZH STATISTIC OT WARD VISIT

## 2018-05-23 PROCEDURE — 25000132 ZZH RX MED GY IP 250 OP 250 PS 637: Mod: GY | Performed by: INTERNAL MEDICINE

## 2018-05-23 PROCEDURE — 12000000 ZZH R&B MED SURG/OB

## 2018-05-23 PROCEDURE — 82947 ASSAY GLUCOSE BLOOD QUANT: CPT | Performed by: ORTHOPAEDIC SURGERY

## 2018-05-23 PROCEDURE — 97530 THERAPEUTIC ACTIVITIES: CPT | Mod: GP | Performed by: PHYSICAL THERAPY ASSISTANT

## 2018-05-23 PROCEDURE — 36415 COLL VENOUS BLD VENIPUNCTURE: CPT | Performed by: ORTHOPAEDIC SURGERY

## 2018-05-23 PROCEDURE — 97116 GAIT TRAINING THERAPY: CPT | Mod: GP | Performed by: PHYSICAL THERAPY ASSISTANT

## 2018-05-23 PROCEDURE — 25000132 ZZH RX MED GY IP 250 OP 250 PS 637: Mod: GY | Performed by: ORTHOPAEDIC SURGERY

## 2018-05-23 PROCEDURE — 99232 SBSQ HOSP IP/OBS MODERATE 35: CPT | Performed by: INTERNAL MEDICINE

## 2018-05-23 PROCEDURE — A9270 NON-COVERED ITEM OR SERVICE: HCPCS | Mod: GY | Performed by: ORTHOPAEDIC SURGERY

## 2018-05-23 PROCEDURE — 97535 SELF CARE MNGMENT TRAINING: CPT | Mod: GO

## 2018-05-23 RX ORDER — CHLORPROMAZINE HYDROCHLORIDE 25 MG/1
25 TABLET, FILM COATED ORAL ONCE
Status: COMPLETED | OUTPATIENT
Start: 2018-05-23 | End: 2018-05-23

## 2018-05-23 RX ADMIN — OXYCODONE HYDROCHLORIDE 5 MG: 5 TABLET ORAL at 08:00

## 2018-05-23 RX ADMIN — SENNOSIDES AND DOCUSATE SODIUM 1 TABLET: 8.6; 5 TABLET ORAL at 20:03

## 2018-05-23 RX ADMIN — SENNOSIDES AND DOCUSATE SODIUM 2 TABLET: 8.6; 5 TABLET ORAL at 08:00

## 2018-05-23 RX ADMIN — ACETAMINOPHEN 975 MG: 325 TABLET ORAL at 10:13

## 2018-05-23 RX ADMIN — ACETAMINOPHEN 975 MG: 325 TABLET ORAL at 02:05

## 2018-05-23 RX ADMIN — CHLORPROMAZINE HYDROCHLORIDE 25 MG: 25 TABLET, SUGAR COATED ORAL at 20:53

## 2018-05-23 RX ADMIN — RIVAROXABAN 10 MG: 10 TABLET, FILM COATED ORAL at 08:01

## 2018-05-23 RX ADMIN — OXYCODONE HYDROCHLORIDE 5 MG: 5 TABLET ORAL at 11:57

## 2018-05-23 RX ADMIN — ACETAMINOPHEN 975 MG: 325 TABLET ORAL at 18:59

## 2018-05-23 RX ADMIN — CALCIUM CARBONATE (ANTACID) CHEW TAB 500 MG 1000 MG: 500 CHEW TAB at 11:57

## 2018-05-23 NOTE — PLAN OF CARE
Problem: Patient Care Overview  Goal: Plan of Care/Patient Progress Review  Outcome: Improving  A&Ox4. Slight temp of 99.5F, encouraged use of IS, temp down to 98.9F at recheck. RA. CMS intact. Dressing CDI. Voiding in adequate amounts. Hemovac patent. Up w/ Ax1, gait belt, walker. BS activex4, passing flatus, tolerating regular diet well, fair appetite. Pain managed w/ scheduled po Tylenol and prn po Oxycodone. IV Diluadid also available. Will continue to monitor.

## 2018-05-23 NOTE — PLAN OF CARE
Problem: Patient Care Overview  Goal: Plan of Care/Patient Progress Review  Outcome: No Change  Pt A&Ox4, VSS, denies pain. Right hip dressing C/D/I.  CMS intact.  HV to right hip. Per pt, he states he's had hiccups for 2 days.  Hearing aids at bedside.  +4 BS, last BM 5/21/18.  Voiding in urinals at bedside.  Up 1 assist gb/ww.  Plan:  Pain control, OT/PT, SW following.  Will continue to monitor.

## 2018-05-23 NOTE — PLAN OF CARE
"Problem: Patient Care Overview  Goal: Plan of Care/Patient Progress Review  OT- eval completed, tx initiated. Pt is an 83 yo male seen POD #2 s/p R LEYDA. [place] with 2 stairs to enter. He is IND in all ADL/IADLs, including driving. He had a recent fall in March resulting in loss of consciousness and states he never sought medical assistance following the fall.     Discharge Planner OT   Patient plan for discharge: home with spouse A  Current status: Able to verbalize 2 hip precautions with cuing. Educated in hip precautions, provided handout. Supine>sit EOB CGA with heavy VCs and extra time needed; noted increased difficulty during task completion. Sit>stand CGA with 2ww. Ambulated to bathroom CGA with 2ww, noted slow gait speed and \"hop step\" at times, VCs to correct. Pt stood sinkside to complete 4 g/h tasks close SBA. Noted pt with difficulty problem solving with novel items; as pt unable to open soap bottle despite verbal cuing, needing Max A to open bottle. Pt with intact fine motor coordination. Discussed home toilet set-up, after instruction, pt completed sit<>stand comfort height toilet MIN A with use of grab bars; extra time needed. Discussed recommendation for RTS with B rails. Ambulated back to sit in bedside chair, CGA with 2ww; stand>sit CGA. Alarm armed at end of session, all needs within reach.  Barriers to return to prior living situation: pain, hip precautions, cognition  Recommendations for discharge: TCU; recommend AE/DME upon return home  Rationale for recommendations: Pt to benefit from continued IP OT services to increase independence in ADL and functional mobility tasks prior to safe d/c home. Currently functioning below baseline in ADL and functional mobility tasks d/t post-op status and possible cognitive deficits       Entered by: Monique Castellon 05/23/2018 8:52 AM           "

## 2018-05-23 NOTE — PLAN OF CARE
Problem: Patient Care Overview  Goal: Plan of Care/Patient Progress Review  Outcome: Improving  Day RN  Bp soft in am patient asymptomatic, bp meds held per parameter. Drinking and eating well, passing gas, hiccups through out the day , declined medication, heart burn around lunch time, tums given.   Minimal pain taking prn oxycodone before pt along with scheduled tylenol.   Voiding well.  hemovac dc dressing is CDI. CMS+  Moving well. Up with A1 gait belt and walker.   Dc to home thursday

## 2018-05-23 NOTE — PLAN OF CARE
Problem: Patient Care Overview  Goal: Plan of Care/Patient Progress Review  Discharge Planner PT   Patient plan for discharge: Home w/ spouse , HEP tomorrow pm or Thurs am pending progress.  Current status: Pt transfers supine to sit with mod to min assist with both LE and min assist with supine to sit. Pt transfers sit to/from stand with CGA with vcs for hand placement. Pt transfers bed to/from chair with ww with min assist. Pt amb 35' with ww with min assist with improved gait pattern. Note pt to amb with step thru gait pattern with vcs for pt to look up.   Barriers to return to prior living situation: Will practice stairs tomorrow, work on bed mobility, pt should be able to make all goals by tomorrow pm or Thurs am.  Recommendations for discharge: Collaborated with PT and recommend TCU at this time.   Rationale for recommendations: Skilled PT while hospitalized to achieve all goals, improve strength, reinforce precautions, HEP should  be sufficient upon dc. SPouse supportive and able to Supervise and provide min A PRN.        Entered by: Erica Powell 05/23/2018 9:04 AM

## 2018-05-23 NOTE — PROGRESS NOTES
05/23/18 0740   Quick Adds   Type of Visit Initial Occupational Therapy Evaluation   Living Environment   Lives With spouse   Living Arrangements house   Home Accessibility stairs to enter home  (walk-in shower)   Number of Stairs to Enter Home 2   Number of Stairs Within Home 1  (sunken living room)   Transportation Available car;family or friend will provide   Living Environment Comment Pt lives with his wife in a farm home with 2 stairs to enter. His wife is able to drive as needed.   Self-Care   Dominant Hand right   Usual Activity Tolerance good   Current Activity Tolerance moderate   Regular Exercise yes   Activity/Exercise Type walking   Exercise Amount/Frequency 45 mins;daily   Equipment Currently Used at Home walker, rolling;cane, straight  (built in shower bench)   Activity/Exercise/Self-Care Comment States he walks daily but hasn't been able to in past 3 weeks d/t hip pain.    Functional Level Prior   Ambulation 0-->independent   Transferring 0-->independent   Toileting 0-->independent   Bathing 0-->independent   Dressing 0-->independent   Eating 0-->independent   Communication 0-->understands/communicates without difficulty   Swallowing 0-->swallows foods/liquids without difficulty   Cognition 0 - no cognition issues reported   Fall history within last six months yes   Number of times patient has fallen within last six months 1  (fell on ice getting the mail, lost consciousness)   Which of the above functional risks had a recent onset or change? transferring;toileting;bathing;dressing   Prior Functional Level Comment Pt was IND in all ADLs PTA   General Information   Onset of Illness/Injury or Date of Surgery - Date 05/21/18   Referring Physician Dale Mcghee MD   Patient/Family Goals Statement to return home    Additional Occupational Profile Info/Pertinent History of Current Problem Pt is a 82 year old male seen POD #2 s/p right total hip arthroplasty.  He has a previous history of hypertension,  dyslipidemia, pulmonary embolus after spine surgery, spinal fusion, 3.5 cm abdominal aortic aneurysm, shoulder surgery, 2 knee surgeries and an episode of diverticulitis.   Precautions/Limitations right hip precautions;fall precautions  (no active abduction; full extension)   Weight-Bearing Status - RLE weight-bearing as tolerated   General Info Comments Alert, pleasant. Wears hearing aids   Cognitive Status Examination   Orientation orientation to person, place and time   Level of Consciousness alert   Able to Follow Commands WNL/WFL   Personal Safety (Cognitive) WNL/WFL   Organization/Problem Solving Problem solving impaired   Cognitive Comment Pt states he had a fall in March resulting in loss of consciousness; states he never seeked medical help following fall. Noted during eval that problem solving with novel items was impaired.   Visual Perception   Visual Perception Wears glasses  (readers)   Sensory Examination   Sensory Comments denies n/t    Pain Assessment   Patient Currently in Pain No   Integumentary/Edema   Integumentary/Edema Comments R LE not observed   Range of Motion (ROM)   ROM Comment B UE WFL   Strength   Strength Comments B UE WFL    Hand Strength   Hand Strength Comments strong bilaterally   Coordination   Upper Extremity Coordination No deficits were identified   Mobility   Bed Mobility Comments Supine>sit EOB close SBA with heavy cuing and extra time needed; noted increased difficulty to complete task   Transfer Skills   Transfer Comments CGA for all functional mobility within room   Transfer Skill: Sit to Stand   Level of [place]: Sit/Stand contact guard   Physical Assist/Nonphysical Assist: Sit/Stand verbal cues;1 person assist   Transfer Skill: Sit to Stand weight-bearing as tolerated   Assistive Device for Transfer: Sit/Stand rolling walker   Transfer Skill: Toilet Transfer   Level of [place]: Toilet minimum assist (75% patients effort)   Physical Assist/Nonphysical Assist:  "Toilet verbal cues;1 person assist   Weight-Bearing Restrictions: Toilet weight-bearing as tolerated   Assistive Device rolling walker;grab bars   Balance   Balance Comments general unsteadiness as expected post-op   Grooming   Level of [place]: Grooming stand-by assist   Physical Assist/Nonphysical Assist: Grooming verbal cues   Assistive Device (close SBA required)   Instrumental Activities of Daily Living (IADL)   Previous Responsibilities yardwork;shopping;driving;medication management;finances  (pet care)   IADL Comments [place], manages all the yardwork (has a riding ). His wife manages all other IADLs. Pt has a daughter that lives nearby.   Activities of Daily Living Analysis   Impairments Contributing to Impaired Activities of Daily Living pain;post surgical precautions   General Therapy Interventions   Planned Therapy Interventions ADL retraining;progressive activity/exercise   Clinical Impression   Criteria for Skilled Therapeutic Interventions Met yes, treatment indicated   OT Diagnosis Decreased ADLs/IADLs   Influenced by the following impairments pain;post surgical precautions   Assessment of Occupational Performance 3-5 Performance Deficits   Identified Performance Deficits ADL/IADLs: dressing, bathing, toileting, g/h, driving   Clinical Decision Making (Complexity) Low complexity   Therapy Frequency daily   Predicted Duration of Therapy Intervention (days/wks) 3 days   Anticipated Equipment Needs at Discharge bath sponge;reacher;shower chair;raised toilet seat   Anticipated Discharge Disposition Home;Other (see comments)  (with AE/DME)   Risks and Benefits of Treatment have been explained. Yes   Patient, Family & other staff in agreement with plan of care Yes   Clinical Impression Comments Agreeable to therapy; has had previous L LEYDA   [facility] AM-PAC TM \"6 Clicks\"   2016, Trustees of [facility], under license to [employer].  All rights reserved.   6 " "Clicks Short Forms Daily Activity Inpatient Short Form   [facility] AM-PAC  \"6 Clicks\" Daily Activity Inpatient Short Form   1. Putting on and taking off regular lower body clothing? 3 - A Little   2. Bathing (including washing, rinsing, drying)? 3 - A Little   3. Toileting, which includes using toilet, bedpan or urinal? 3 - A Little   4. Putting on and taking off regular upper body clothing? 4 - None   5. Taking care of personal grooming such as brushing teeth? 3 - A Little   6. Eating meals? 4 - None   Daily Activity Raw Score (Score out of 24.Lower scores equate to lower levels of function) 20   Total Evaluation Time   Total Evaluation Time (Minutes) 10     "

## 2018-05-23 NOTE — PROGRESS NOTES
"[facility]  Hospitalist Progress Note    Bernabe Bhagat MD 05/23/2018  Text Page      Reason for Stay (Diagnosis): Right total hip arthroplasty         Assessment and Plan:      Summary of Stay: Lobito David is a 82 year old male admitted on 5/21/2018 after right total hip arthroplasty.  He has a previous history of hypertension, dyslipidemia, pulmonary embolus after spine surgery, spinal fusion, 3.5 cm abdominal aortic aneurysm, shoulder surgery, 2 knee surgeries and an episode of diverticulitis.     Problem List:   1. Postop right total hip arthroplasty  -Had quite a bit of pain postoperatively but now improving quite a bit..  -Continue physical therapy and pain control per orthopedics     2.  Hypertension-blood pressure has been a bit low periodically likely related to pain medications and blood loss.  -Resumed lisinopril with parameters     3.  Dyslipidemia  -Hold statin for now     4.  Previous history of pulmonary embolus after spine surgery  -Agree with systemic anticoagulation with Xarelto      # Pain Assessment:  Current Pain Score 5/23/2018   Patient currently in pain? sleeping: patient not able to self report   Pain score (0-10) -   Pain location -   Pain descriptors -   Pain well controlled per patient.  Orthopedics managing.      DVT Prophylaxis: Xarelto  Code Status: Full              Interval History (Subjective):      Pain continues to improve.  Otherwise feels well.                  Physical Exam:      Last Vital Signs:  BP 98/62  Pulse 85  Temp 96.5  F (35.8  C) (Oral)  Resp 20  Ht 1.753 m (5' 9\")  Wt 86.2 kg (190 lb)  SpO2 94%  BMI 28.06 kg/m2      Vital signs reviewed  General:  Alert, calm, NAD  CV: regular rate and rhythm, no murmurs or rubs  Lungs:  Clear to ascultation bilaterally, normal respiratory effort  Abdomen:  Soft, nontender, nondistended, no masses, normal bowel sounds  Extremities:  No edema  Neuro: normal strength and sensation in his legs.  "   Psychiatric:  Mood and affect within normal limits           Medications:      All current medications were reviewed with changes reflected in problem list.         Data:      All new lab and imaging data was reviewed.   Labs:    Hemoglobin 9.7

## 2018-05-23 NOTE — CONSULTS
Care Transitions Team: Following for CC, discharge planning, and disposition.        Per chart review MD has consulted [facility] for potential for disposition concerns.     Per PT  POD 1 assessment   Discharge Planner PT   Patient plan for discharge: Home w/ spouse , HEP tomorrow pm or Thurs am pending progress.  Current status: Pain 6/10, Min/Mod A bed mobility. Min A transfers, gait with fww 150' max with vc needed for proper gait pattern. Tolerating ex. Sleepy.   Barriers to return to prior living situation: Will practice stairs tomorrow, work on bed mobility, pt should be able to make all goals by tomorrow pm or Thurs am.  Recommendations for discharge: Home w/ spouse, HEP  Rationale for recommendations: Skilled PT while hospitalized to achieve all goals, improve strength, reinforce precautions, HEP should  be sufficient upon dc. SPouse supportive and able to Supervise and provide min A PRN.     Not anticapting any concerns with Discharge dispos. Please re- consult [facility] if POC changes.     Allison Montgomery RN, BSN, [facility]  Care Transitions Team  [phone]

## 2018-05-24 ENCOUNTER — APPOINTMENT (OUTPATIENT)
Dept: OCCUPATIONAL THERAPY | Facility: CLINIC | Age: 83
DRG: 470 | End: 2018-05-24
Attending: ORTHOPAEDIC SURGERY
Payer: MEDICARE

## 2018-05-24 ENCOUNTER — APPOINTMENT (OUTPATIENT)
Dept: PHYSICAL THERAPY | Facility: CLINIC | Age: 83
DRG: 470 | End: 2018-05-24
Attending: ORTHOPAEDIC SURGERY
Payer: MEDICARE

## 2018-05-24 VITALS
HEART RATE: 85 BPM | TEMPERATURE: 98.3 F | WEIGHT: 190 LBS | HEIGHT: 69 IN | DIASTOLIC BLOOD PRESSURE: 55 MMHG | OXYGEN SATURATION: 93 % | RESPIRATION RATE: 18 BRPM | SYSTOLIC BLOOD PRESSURE: 113 MMHG | BODY MASS INDEX: 28.14 KG/M2

## 2018-05-24 LAB
CREAT SERPL-MCNC: 1.6 MG/DL (ref 0.66–1.25)
GFR SERPL CREATININE-BSD FRML MDRD: 42 ML/MIN/1.7M2

## 2018-05-24 PROCEDURE — 97535 SELF CARE MNGMENT TRAINING: CPT | Mod: GO

## 2018-05-24 PROCEDURE — 82565 ASSAY OF CREATININE: CPT | Performed by: INTERNAL MEDICINE

## 2018-05-24 PROCEDURE — 97530 THERAPEUTIC ACTIVITIES: CPT | Mod: GP | Performed by: PHYSICAL THERAPY ASSISTANT

## 2018-05-24 PROCEDURE — 25000132 ZZH RX MED GY IP 250 OP 250 PS 637: Mod: GY | Performed by: ORTHOPAEDIC SURGERY

## 2018-05-24 PROCEDURE — 97110 THERAPEUTIC EXERCISES: CPT | Mod: GP | Performed by: PHYSICAL THERAPY ASSISTANT

## 2018-05-24 PROCEDURE — 40000193 ZZH STATISTIC PT WARD VISIT: Performed by: PHYSICAL THERAPY ASSISTANT

## 2018-05-24 PROCEDURE — 40000133 ZZH STATISTIC OT WARD VISIT

## 2018-05-24 PROCEDURE — 25000132 ZZH RX MED GY IP 250 OP 250 PS 637: Mod: GY | Performed by: INTERNAL MEDICINE

## 2018-05-24 PROCEDURE — 36415 COLL VENOUS BLD VENIPUNCTURE: CPT | Performed by: INTERNAL MEDICINE

## 2018-05-24 PROCEDURE — A9270 NON-COVERED ITEM OR SERVICE: HCPCS | Mod: GY | Performed by: INTERNAL MEDICINE

## 2018-05-24 PROCEDURE — A9270 NON-COVERED ITEM OR SERVICE: HCPCS | Mod: GY | Performed by: ORTHOPAEDIC SURGERY

## 2018-05-24 PROCEDURE — 97116 GAIT TRAINING THERAPY: CPT | Mod: GP | Performed by: PHYSICAL THERAPY ASSISTANT

## 2018-05-24 RX ORDER — CHLORPROMAZINE HYDROCHLORIDE 25 MG/1
25-50 TABLET, FILM COATED ORAL 3 TIMES DAILY PRN
Qty: 15 TABLET | Refills: 0 | Status: SHIPPED | OUTPATIENT
Start: 2018-05-24

## 2018-05-24 RX ADMIN — CALCIUM CARBONATE (ANTACID) CHEW TAB 500 MG 1000 MG: 500 CHEW TAB at 13:43

## 2018-05-24 RX ADMIN — ACETAMINOPHEN 975 MG: 325 TABLET ORAL at 02:08

## 2018-05-24 RX ADMIN — CALCIUM CARBONATE (ANTACID) CHEW TAB 500 MG 1000 MG: 500 CHEW TAB at 11:46

## 2018-05-24 RX ADMIN — LISINOPRIL 20 MG: 20 TABLET ORAL at 08:24

## 2018-05-24 RX ADMIN — ACETAMINOPHEN 975 MG: 325 TABLET ORAL at 10:18

## 2018-05-24 RX ADMIN — RIVAROXABAN 10 MG: 10 TABLET, FILM COATED ORAL at 08:25

## 2018-05-24 RX ADMIN — OXYCODONE HYDROCHLORIDE 5 MG: 5 TABLET ORAL at 08:24

## 2018-05-24 RX ADMIN — OXYCODONE HYDROCHLORIDE 5 MG: 5 TABLET ORAL at 11:49

## 2018-05-24 RX ADMIN — SENNOSIDES AND DOCUSATE SODIUM 2 TABLET: 8.6; 5 TABLET ORAL at 08:24

## 2018-05-24 NOTE — PROGRESS NOTES
Cross coverage:  I was notified by nursing service regarding persistent hiccups for the past 3 days.  Trial of one dose of Thorazine at 25 mg.

## 2018-05-24 NOTE — PHARMACY - DISCHARGE MEDICATION RECONCILIATION AND EDUCATION
Discharge medication review/education for this patient is complete. Face-to-face medication education was provided by the pharmacist.  See [facility] for allergy information and immunization status.   Pharmacist assisted with medication reconciliation of discharge medications with PTA medications.    Patient was informed to STOP taking the following HOME medications:   none    Patient was informed to START taking the following NEW medications:   Xarelto, Oxycodone and Chlorpromazine    Patient was informed to make the following changes to prior to admission medications:  none    Patient was educated on the following for each discharge medication:   Rationale for therapy  Duration of treatment  Dosing and or monitoring drug levels  Common side effects  Importance of compliance  Drug/food interactions  Missed doses  Self monitoring parameters    Left written materials and instructions (Clinical notes from [facility]) for new medications: Yes    OUTCOMES: Patient verbalized understanding      Discharge Medication List     Review of your medicines      START taking       Dose / Directions    chlorproMAZINE 25 MG tablet   Commonly known as:  THORAZINE   Used for:  Hiccups        Dose:  25-50 mg   Take 1-2 tablets (25-50 mg) by mouth 3 times daily as needed (hiccups)   Quantity:  15 tablet   Refills:  0       oxyCODONE IR 5 MG tablet   Commonly known as:  ROXICODONE        Dose:  5 mg   Take 1 tablet (5 mg) by mouth every 3 hours as needed for other (pain control or improvement in physical function. Hold dose for analgesic side effects.)   Quantity:  60 tablet   Refills:  0       rivaroxaban ANTICOAGULANT 10 MG Tabs tablet   Commonly known as:  XARELTO        Dose:  10 mg   Take 1 tablet (10 mg) by mouth daily   Quantity:  30 tablet   Refills:  0         CONTINUE these medicines which have NOT CHANGED       Dose / Directions    acetaminophen 325 MG tablet   Commonly known as:  TYLENOL   Used for:  S/P lumbar fusion        Dose:  650  mg   Take 2 tablets (650 mg) by mouth every 6 hours as needed for mild pain   Quantity:  40 tablet   Refills:  0       PRAVACHOL PO        Dose:  10 mg   Take 10 mg by mouth every evening   Refills:  0       ZESTRIL PO        Dose:  20 mg   Take 20 mg by mouth daily   Refills:  0            Where to get your medicines      These medications were sent to [place], [place] - [address] [address]  [address] [address] [address]     Phone:  [phone]      chlorproMAZINE 25 MG tablet         Some of these will need a paper prescription and others can be bought over the counter. Ask your nurse if you have questions.     Bring a paper prescription for each of these medications      oxyCODONE IR 5 MG tablet     rivaroxaban ANTICOAGULANT 10 MG Tabs tablet

## 2018-05-24 NOTE — PROGRESS NOTES
Transition Communication Hand-off for Care Transitions to Next Level of Care Provider    Name: Lobito David  : 1935  MRN #: [id number]  Primary Care Provider: Mario Alberto Wang  Primary Care MD Name: Elizabeth   Primary Clinic: [place] [address] [facility] [address]  [place] [address]  Primary Care Clinic Name:  ([facility] )  Reason for Hospitalization:  DJD  S/P total hip arthroplasty  Admit Date/Time: 2018 12:40 PM  Discharge Date: 2018  Payor Source: Payor: Jaco Solarsi / Plan: [employer] PLAN / Product Type: HMO /   Readmission Assessment Measure (SUGEY) Risk Score/category: LOW   Patient will receive the following: HOME CARE  [facility] RN/PT/OT     Key Recommendations:  Pt wife and pt have been through surgical recovery before with wife the main support and assist while pt has recovered from previous hip surgery and a back surgery   Pt was actually doing well with therapies but there was some hesitation and at one point TCU was recommended. Wife came in and joined pt for his  PT/OT sessions and it was decided that final recommendations would be for home PT/OT, for equipment and safety evaluation , RN was added as pt had experienced some low BP's Pt discharging on Xarelto.     Allison Montgomery    AVS/Discharge Summary is the source of truth; this is a helpful guide for improved communication of patient story

## 2018-05-24 NOTE — DISCHARGE INSTRUCTIONS
Your home care referral was sent to [facility].  If you haven't heard from them within the next 24-48 hours,  Please call them @ [phone]     Note:  You may not go to therapy appointments at the physical therapy building until home care has concluded.  PT will come to your home.            Return to clinic in10-14 days. Call [phone] to schedule or if you experience any problems before your scheduled appointment.      See general discharge instruction sheet for hips.  1. Do exercises as instructed by therapist.  2. Observe your hip precautions.  3. Walk daily increasing distance and time each day by a little.  4. Ice hip for swelling and discomfort.  5. May shower, inspect dressing daily for problems listed below   6.Notify your dentist or physician of your implant so you can get antibiotics before any dental work or any other invasive procedure (ie: colonoscopy).  7. Do not cross legs.      Aquacel dressing:  DO NOT CHANGE DRESSING DAILY.  Leave this dressing on until follow-up appointment with Orthopedic Surgeon.  Dressing is waterproof, can shower with it on, pat dry when done.  No soaking such as in tub baths, pools or hot tubs        While on narcotic pain medication, to prevent constipation:  1. Drink plenty of water to keep well hydrated   2. May take over the counter Colace or Senna (follow instructions on label)       Call your physician if: ([phone])   1. Persistent fever greater than 100 degrees with body chills or excessive sweating.  2. Increased/persistent redness, localized warmth, tenderness, drainage or swelling at incision site. Greater than 50% drainage on dressing.   3. Pain not controlled with oral pain medications, ice and rest.   4. No bowel movement in 3 days (may use Milk of Magnesia or other over the counter remedy).  5. Chest pain, shortness of breath, and/or calf pain with excessive swelling.  6. Generalized feeling of illness.  7. Any other questions or  concerns related to your surgery/recovery.        Thank you for allowing [facility] to participate in your cares!!

## 2018-05-24 NOTE — PLAN OF CARE
Problem: Patient Care Overview  Goal: Plan of Care/Patient Progress Review  Outcome: Improving  Patient is assist of one with walker and gait belt.  Tylenol for pain.  Has Oxy available.  Has had persistent hiccups for the last 3 days.  Going to to try Thorazine once pharmacy approves it.  Hx of AAA and HTN.  Voiding via urinal.  Regular diet.  CMS intact.  No IV access.  DC tomorrow.    10:00 - Thorazine did not work.

## 2018-05-24 NOTE — PROGRESS NOTES
Care Transition Initial Assessment - RN    Reason For Consult: care coordination/care conference, discharge planning   Met with: Patient and Family.  DATA   Active Problems:    S/P total hip arthroplasty       Cognitive Status: awake, alert and oriented.  Primary Care Clinic Name: [facility]   Primary Care MD Name: ayah   Contact information and PCP information verified: Yes  Lives With: spouse  Living Arrangements: house                 Insurance concerns: No Insurance issues identified  ASSESSMENT  Patient currently receives the following services:  None  Identified issues/concerns regarding health management: Post op Hip with recommendations for TCU. Met with pt. and pt wife at bedside, also collaborated with OT who had just finished up pt afternoon session.  Per OT, after working with pt and wife, final recommendation is for Home With Home Services.   This was discussed with Dr. Mcghee with agreement   Orders to be placed.   Pt and wife are in agreement for these services and choice is [facility].     Referral placed.   Will provided FYI handoff to PCP on discharge  Pt will follow up with Ortho as instructed .      Allison Montgomery RN, BSN, CTS  Care Transitions Team  [phone]

## 2018-05-24 NOTE — PLAN OF CARE
"Problem: Patient Care Overview  Goal: Plan of Care/Patient Progress Review      Discharge Planner OT   Patient plan for discharge: home with spouse A  Current status: Identified hip precautions as \"not swinging my legs around, re-educated on precautions. Spouse present for session and intends to A pt as needed at home. OT educated wife in safe caregiver technique to A with transfers. Supine>sit EOB Min A from wife for LE management with VCs for technique. Sit>stand SBA with Fww. Ambulated to bathroom SBA with Fww. Toilet transfer completed with SBA with grab bar to replicate home vanity.    Barriers to return to prior living situation: pain, hip precautions, cognition  Recommendations for discharge: Home with wife, home OT safety eval.  Rationale for recommendations: Pt functioning below baseline, would benefit from OT home safety eval upon return home.        Entered by: Wendy Holcomb 05/24/2018 3:14 PM         Occupational Therapy Discharge Summary    Reason for therapy discharge:    Discharged to home with home therapy.    Progress towards therapy goal(s). See goals on Care Plan in [facility] electronic health record for goal details.  Goals met    Therapy recommendation(s):    Continued therapy is recommended.  Rationale/Recommendations:  Home OT for safety eval.      "

## 2018-05-24 NOTE — PROGRESS NOTES
Reviewed discharge instructions and medications with patient and wife. Questions answered. Pt and wife instructed PT and OT will call to make home appointments. Patient discharged to home with wife, discharge instructions, medications (Thorazine, Xarelto, and Oxycodone), and belongings at this time.

## 2018-05-24 NOTE — PLAN OF CARE
Problem: Patient Care Overview  Goal: Plan of Care/Patient Progress Review  Discharge Planner PT   Patient plan for discharge: Home w/ spouse , HEP tomorrow pm or Thurs am pending progress.  Current status: Pt transfers supine to sit with mod assist of 1 (trunk) and min assist of 1 B LEs. Pt transfers sit to supine with min assist. Pt transfers sit to/from stand with min assist and CGA with bed to/from wc with ww.  Pt amb 200' with ww with CGA with shortened step length with step to gait pattern. Pt performed 4 steps with B rails with min assist.   Barriers to return to prior living situation: stairs   Recommendations for discharge: Collaborated with PT and recommend TCU at this time.   Rationale for recommendations: Skilled PT while hospitalized to achieve all goals, improve strength, reinforce precautions, HEP should  be sufficient upon dc. SPouse supportive and able to Supervise and provide min A PRN. Collaborated with PT and if pt discharge to home recommend home PT.       Entered by: Erica Powell 05/24/2018 10:18 AM

## 2018-05-24 NOTE — PLAN OF CARE
Problem: Hip Arthroplasty (Total, Partial) (Adult)  Goal: Signs and Symptoms of Listed Potential Problems Will be Absent, Minimized or Managed (Hip Arthroplasty)  Signs and symptoms of listed potential problems will be absent, minimized or managed by discharge/transition of care (reference Hip Arthroplasty (Total, Partial) (Adult) CPG).   Outcome: Improving  A&Ox4, up heavy Ax1 w/GB & walker  No PIV access  Vitals stable, RA  Pain: denies, sched tylenol   CMS intact  Aquacel CDI, ice applied  Followed by Ortho, PT, OT  Plan: DC home w/spouse  Will continue to monitor

## 2018-05-29 NOTE — DISCHARGE SUMMARY
Admit Date:     05/21/2018   Discharge Date:     05/24/2018      ADMISSION DIAGNOSIS:  Osteoarthrosis of the right hip.      DISCHARGE DIAGNOSIS:  Status post right total hip arthroplasty.      PROCEDURE:  Right total hip arthroplasty performed by Dr. Shayla Mcghee at [facility] on 5/21/18.      INDICATIONS FOR ADMISSION:  The patient was admitted for postoperative observation care and pain control following his right total hip arthroplasty.      HOSPITAL COURSE:  On 5/21/18 the patient underwent a planned right total hip arthroplasty performed by Dr. Shayla Mcghee.  He tolerated the procedure well and was transferred up to the Medical/Surgical floor in stable condition.  During his stay, his vital signs remained stable.  His hemoglobin dropped to a low of 9.7 postoperatively and he required no blood transfusions.  Physical and Occupational Therapy were consulted, and at his time of discharge, he was ambulating well with a front-wheeled walker.  DVT and PE prophylaxis included early ambulation, lower extremity compression devices, lower extremity pneumatic devices and oral Xarelto.  Incentive spirometry was utilized for pneumonia prophylaxis.  On 5/24/18 the patient was in stable condition and was discharged home.      DISCHARGE INSTRUCTIONS:  On 05/24/2018, the patient was discharged home with the following instructions:  Weightbearing as tolerates, ice to the hip p.r.n. and swelling and avoid active abduction of the hip.      DISCHARGE MEDICATIONS:  Orthopedic medications at time of discharge included:   1. Oxycodone 5 mg by mouth every 3 hours as needed for pain.   2. Acetaminophen 650 mg by mouth every 6 hours as needed for pain.    3. Xarelto 10 mg by mouth daily for 30 days.      FOLLOWUP:  The patient will follow up at [facility] in 2 weeks.         SHAYLA MCGHEE MD       As dictated by NIKOLAI VELÁSQUEZ PA-C            D: 05/28/2018   T: 05/28/2018   MT: JACKIE      Name:      CECELIAGURPREET   MRN:      [id number]        Account:        [id number]   :      1935           Admit Date:     2018                                  Discharge Date: 2018      Document: [id number]

## 2021-02-27 ENCOUNTER — HEALTH MAINTENANCE LETTER (OUTPATIENT)
Age: 86
End: 2021-02-27

## 2021-10-02 ENCOUNTER — HEALTH MAINTENANCE LETTER (OUTPATIENT)
Age: 86
End: 2021-10-02

## 2022-03-19 ENCOUNTER — HEALTH MAINTENANCE LETTER (OUTPATIENT)
Age: 87
End: 2022-03-19

## 2022-09-03 ENCOUNTER — HEALTH MAINTENANCE LETTER (OUTPATIENT)
Age: 87
End: 2022-09-03

## 2023-04-29 ENCOUNTER — HEALTH MAINTENANCE LETTER (OUTPATIENT)
Age: 88
End: 2023-04-29

## 2024-12-22 ENCOUNTER — TRANSFERRED RECORDS (OUTPATIENT)
Dept: MULTI SPECIALTY CLINIC | Facility: CLINIC | Age: 89
End: 2024-12-22

## 2024-12-22 LAB
CHOLESTEROL (EXTERNAL): 154 MG/DL (ref 100–199)
HDLC SERPL-MCNC: 35 MG/DL
LDL CHOLESTEROL CALCULATED (EXTERNAL): 85 MG/DL
NON HDL CHOLESTEROL (EXTERNAL): 119 MG/DL
TRIGLYCERIDES (EXTERNAL): 171 MG/DL

## 2025-01-10 ENCOUNTER — HOSPITAL ENCOUNTER (INPATIENT)
Facility: CLINIC | Age: 89
LOS: 3 days | Discharge: HOME | DRG: 516 | End: 2025-01-13
Attending: EMERGENCY MEDICINE | Admitting: STUDENT IN AN ORGANIZED HEALTH CARE EDUCATION/TRAINING PROGRAM
Payer: COMMERCIAL

## 2025-01-10 ENCOUNTER — APPOINTMENT (OUTPATIENT)
Dept: MRI IMAGING | Facility: CLINIC | Age: 89
DRG: 516 | End: 2025-01-10
Attending: EMERGENCY MEDICINE
Payer: COMMERCIAL

## 2025-01-10 DIAGNOSIS — M31.6 GIANT CELL ARTERITIS (H): ICD-10-CM

## 2025-01-10 DIAGNOSIS — R73.03 PREDIABETES: ICD-10-CM

## 2025-01-10 DIAGNOSIS — Z96.641 STATUS POST TOTAL REPLACEMENT OF RIGHT HIP: ICD-10-CM

## 2025-01-10 DIAGNOSIS — E66.9 OBESITY, UNSPECIFIED CLASS, UNSPECIFIED OBESITY TYPE, UNSPECIFIED WHETHER SERIOUS COMORBIDITY PRESENT: Primary | ICD-10-CM

## 2025-01-10 DIAGNOSIS — T38.0X5A STEROID-INDUCED DIABETES MELLITUS, INITIAL ENCOUNTER: ICD-10-CM

## 2025-01-10 DIAGNOSIS — E09.9 STEROID-INDUCED DIABETES MELLITUS, INITIAL ENCOUNTER: ICD-10-CM

## 2025-01-10 DIAGNOSIS — H54.61 VISION LOSS OF RIGHT EYE: ICD-10-CM

## 2025-01-10 LAB
ANION GAP SERPL CALCULATED.3IONS-SCNC: 13 MMOL/L (ref 7–15)
BASOPHILS # BLD AUTO: 0 10E3/UL (ref 0–0.2)
BASOPHILS NFR BLD AUTO: 0 %
BUN SERPL-MCNC: 27.7 MG/DL (ref 8–23)
CALCIUM SERPL-MCNC: 9.9 MG/DL (ref 8.8–10.4)
CHLORIDE SERPL-SCNC: 99 MMOL/L (ref 98–107)
CREAT SERPL-MCNC: 1.36 MG/DL (ref 0.67–1.17)
CRP SERPL-MCNC: 3.85 MG/L
EGFRCR SERPLBLD CKD-EPI 2021: 50 ML/MIN/1.73M2
EOSINOPHIL # BLD AUTO: 0.1 10E3/UL (ref 0–0.7)
EOSINOPHIL NFR BLD AUTO: 2 %
ERYTHROCYTE [DISTWIDTH] IN BLOOD BY AUTOMATED COUNT: 12.6 % (ref 10–15)
ERYTHROCYTE [SEDIMENTATION RATE] IN BLOOD BY WESTERGREN METHOD: 72 MM/HR (ref 0–20)
GLUCOSE SERPL-MCNC: 101 MG/DL (ref 70–99)
HCO3 SERPL-SCNC: 26 MMOL/L (ref 22–29)
HCT VFR BLD AUTO: 34.1 % (ref 40–53)
HGB BLD-MCNC: 11.9 G/DL (ref 13.3–17.7)
IMM GRANULOCYTES # BLD: 0.1 10E3/UL
IMM GRANULOCYTES NFR BLD: 1 %
LYMPHOCYTES # BLD AUTO: 1.8 10E3/UL (ref 0.8–5.3)
LYMPHOCYTES NFR BLD AUTO: 34 %
MCH RBC QN AUTO: 32.3 PG (ref 26.5–33)
MCHC RBC AUTO-ENTMCNC: 34.9 G/DL (ref 31.5–36.5)
MCV RBC AUTO: 93 FL (ref 78–100)
MONOCYTES # BLD AUTO: 0.3 10E3/UL (ref 0–1.3)
MONOCYTES NFR BLD AUTO: 6 %
NEUTROPHILS # BLD AUTO: 3 10E3/UL (ref 1.6–8.3)
NEUTROPHILS NFR BLD AUTO: 57 %
NRBC # BLD AUTO: 0 10E3/UL
NRBC BLD AUTO-RTO: 0 /100
PLATELET # BLD AUTO: 177 10E3/UL (ref 150–450)
POTASSIUM SERPL-SCNC: 4.3 MMOL/L (ref 3.4–5.3)
RBC # BLD AUTO: 3.68 10E6/UL (ref 4.4–5.9)
SODIUM SERPL-SCNC: 138 MMOL/L (ref 135–145)
WBC # BLD AUTO: 5.3 10E3/UL (ref 4–11)

## 2025-01-10 PROCEDURE — 86140 C-REACTIVE PROTEIN: CPT | Performed by: EMERGENCY MEDICINE

## 2025-01-10 PROCEDURE — 85652 RBC SED RATE AUTOMATED: CPT | Performed by: EMERGENCY MEDICINE

## 2025-01-10 PROCEDURE — 70543 MRI ORBT/FAC/NCK W/O &W/DYE: CPT | Mod: 26 | Performed by: RADIOLOGY

## 2025-01-10 PROCEDURE — 120N000002 HC R&B MED SURG/OB UMMC

## 2025-01-10 PROCEDURE — 99285 EMERGENCY DEPT VISIT HI MDM: CPT | Mod: 25 | Performed by: EMERGENCY MEDICINE

## 2025-01-10 PROCEDURE — 99223 1ST HOSP IP/OBS HIGH 75: CPT | Mod: GC | Performed by: STUDENT IN AN ORGANIZED HEALTH CARE EDUCATION/TRAINING PROGRAM

## 2025-01-10 PROCEDURE — 258N000003 HC RX IP 258 OP 636: Performed by: EMERGENCY MEDICINE

## 2025-01-10 PROCEDURE — 84520 ASSAY OF UREA NITROGEN: CPT | Performed by: EMERGENCY MEDICINE

## 2025-01-10 PROCEDURE — 250N000011 HC RX IP 250 OP 636: Performed by: EMERGENCY MEDICINE

## 2025-01-10 PROCEDURE — 99285 EMERGENCY DEPT VISIT HI MDM: CPT | Performed by: EMERGENCY MEDICINE

## 2025-01-10 PROCEDURE — 255N000002 HC RX 255 OP 636: Performed by: EMERGENCY MEDICINE

## 2025-01-10 PROCEDURE — 85048 AUTOMATED LEUKOCYTE COUNT: CPT | Performed by: EMERGENCY MEDICINE

## 2025-01-10 PROCEDURE — 70543 MRI ORBT/FAC/NCK W/O &W/DYE: CPT

## 2025-01-10 PROCEDURE — 36415 COLL VENOUS BLD VENIPUNCTURE: CPT | Performed by: EMERGENCY MEDICINE

## 2025-01-10 PROCEDURE — 80048 BASIC METABOLIC PNL TOTAL CA: CPT | Performed by: EMERGENCY MEDICINE

## 2025-01-10 PROCEDURE — 85004 AUTOMATED DIFF WBC COUNT: CPT | Performed by: EMERGENCY MEDICINE

## 2025-01-10 PROCEDURE — A9585 GADOBUTROL INJECTION: HCPCS | Performed by: EMERGENCY MEDICINE

## 2025-01-10 RX ORDER — GADOBUTROL 604.72 MG/ML
9 INJECTION INTRAVENOUS ONCE
Status: COMPLETED | OUTPATIENT
Start: 2025-01-10 | End: 2025-01-10

## 2025-01-10 RX ADMIN — GADOBUTROL 9 ML: 604.72 INJECTION INTRAVENOUS at 21:44

## 2025-01-10 RX ADMIN — SODIUM CHLORIDE 1000 MG: 9 INJECTION, SOLUTION INTRAVENOUS at 22:35

## 2025-01-10 ASSESSMENT — COLUMBIA-SUICIDE SEVERITY RATING SCALE - C-SSRS
2. HAVE YOU ACTUALLY HAD ANY THOUGHTS OF KILLING YOURSELF IN THE PAST MONTH?: NO
6. HAVE YOU EVER DONE ANYTHING, STARTED TO DO ANYTHING, OR PREPARED TO DO ANYTHING TO END YOUR LIFE?: NO
1. IN THE PAST MONTH, HAVE YOU WISHED YOU WERE DEAD OR WISHED YOU COULD GO TO SLEEP AND NOT WAKE UP?: NO

## 2025-01-10 ASSESSMENT — ACTIVITIES OF DAILY LIVING (ADL)
ADLS_ACUITY_SCORE: 44

## 2025-01-10 NOTE — ED PROVIDER NOTES
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  This patient had acute right eye monocular vision loss, painless, 2 weeks ago.  He was evaluated outside facility at the time and underwent MRI and CTAs demonstrating no acute abnormality.  CT of the neck showed 50% stenosis.  The patient has follow-up with ophthalmology then referral to retinal specialist today with presumptive diagnosis as above.  He is referred now for evaluation.  He reports no contralateral involvement.  He reports no recent pain.  No improvement or worsening of vision.  He reports no similar symptoms in the past.  No history of atrial fibrillation.  No headache, change in speech, difficulty with ambulation, change strength or sensation.      Past Medical History  Past Medical History:   Diagnosis Date    AAA (abdominal aortic aneurysm)     Acute diverticulitis     Arthritis     Erectile dysfunction     Hyperlipidemia     Hypertension     Low back pain     Shoulder disorder      Past Surgical History:   Procedure Laterality Date    ARTHROPLASTY HIP Right 5/21/2018    Procedure: ARTHROPLASTY HIP;  Right total hip arthroplasty;  Surgeon: Dale Mcghee MD;  Location:  OR    COLONOSCOPY      OPTICAL TRACKING SYSTEM FUSION POSTERIOR SPINE LUMBAR N/A 2/2/2015    Procedure: OPTICAL TRACKING SYSTEM FUSION SPINE POSTERIOR LUMBAR ONE LEVEL;  Surgeon: Shakir Vasquez MD;  Location:  OR    ORTHOPEDIC SURGERY      femur knee, bilat    ORTHOPEDIC SURGERY      right shoulder arthroscopy    ORTHOPEDIC SURGERY Left     hip surgery, muscles tendon repair     acetaminophen (TYLENOL) 325 MG tablet  chlorproMAZINE (THORAZINE) 25 MG tablet  Lisinopril  (ZESTRIL PO)  oxyCODONE IR (ROXICODONE) 5 MG tablet  Pravastatin Sodium (PRAVACHOL PO)  rivaroxaban ANTICOAGULANT (XARELTO) 10 MG TABS tablet      No Known Allergies  Family History  Family History   Problem Relation Age of Onset    Cardiovascular Sister         cervical    Cancer Sister      Social History   Social History     Tobacco Use    Smoking status: Former    Smokeless tobacco: Never    Tobacco comments:     Quit 44 yrs ago   Substance Use Topics    Alcohol use: Yes     Comment: rarely    Drug use: No         A medically appropriate review of systems was performed with pertinent positives and negatives noted in the HPI, and all other systems negative.      Physical Exam   BP: (!) 160/89  Pulse: 88  Temp: 98.4  F (36.9  C)  Resp: 16  SpO2: 98 %      Physical Exam  Vitals and nursing note reviewed.   Constitutional:       General: He is not in acute distress.     Appearance: Normal appearance. He is obese. He is not ill-appearing, toxic-appearing or diaphoretic.   HENT:      Head: Normocephalic and atraumatic.      Nose: Nose normal. No congestion.      Mouth/Throat:      Mouth: Mucous membranes are moist.   Eyes:      Extraocular Movements:      Right eye: Normal extraocular motion.      Left eye: Normal extraocular motion.      Conjunctiva/sclera:      Right eye: Right conjunctiva is not injected. No chemosis.     Left eye: Left conjunctiva is not injected. No chemosis.     Comments: Diminished vision right eye.  Please refer to ophthalmologic evaluation for full detailed clinical picture.   Cardiovascular:      Rate and Rhythm: Normal rate and regular rhythm.      Pulses: Normal pulses.   Pulmonary:      Effort: Pulmonary effort is normal. No respiratory distress.   Skin:     General: Skin is warm.      Capillary Refill: Capillary refill takes less than 2 seconds.   Neurological:      General: No focal deficit present.      Mental Status: He is alert and oriented to person, place, and time.   Psychiatric:          Mood and Affect: Mood normal.         Behavior: Behavior normal.         ED Course        Procedures         Results for orders placed or performed during the hospital encounter of 01/10/25 (from the past 24 hours)   CBC with platelets differential    Narrative    The following orders were created for panel order CBC with platelets differential.  Procedure                               Abnormality         Status                     ---------                               -----------         ------                     CBC with platelets and d...[[id number]]  Abnormal            Final result                 Please view results for these tests on the individual orders.   Basic metabolic panel   Result Value Ref Range    Sodium 138 135 - 145 mmol/L    Potassium 4.3 3.4 - 5.3 mmol/L    Chloride 99 98 - 107 mmol/L    Carbon Dioxide (CO2) 26 22 - 29 mmol/L    Anion Gap 13 7 - 15 mmol/L    Urea Nitrogen 27.7 (H) 8.0 - 23.0 mg/dL    Creatinine 1.36 (H) 0.67 - 1.17 mg/dL    GFR Estimate 50 (L) >60 mL/min/1.73m2    Calcium 9.9 8.8 - 10.4 mg/dL    Glucose 101 (H) 70 - 99 mg/dL   Erythrocyte sedimentation rate auto   Result Value Ref Range    Erythrocyte Sedimentation Rate 72 (H) 0 - 20 mm/hr   CRP inflammation   Result Value Ref Range    CRP Inflammation 3.85 <5.00 mg/L   CBC with platelets and differential   Result Value Ref Range    WBC Count 5.3 4.0 - 11.0 10e3/uL    RBC Count 3.68 (L) 4.40 - 5.90 10e6/uL    Hemoglobin 11.9 (L) 13.3 - 17.7 g/dL    Hematocrit 34.1 (L) 40.0 - 53.0 %    MCV 93 78 - 100 fL    MCH 32.3 26.5 - 33.0 pg    MCHC 34.9 31.5 - 36.5 g/dL    RDW 12.6 10.0 - 15.0 %    Platelet Count 177 150 - 450 10e3/uL    % Neutrophils 57 %    % Lymphocytes 34 %    % Monocytes 6 %    % Eosinophils 2 %    % Basophils 0 %    % Immature Granulocytes 1 %    NRBCs per 100 WBC 0 <1 /100    Absolute Neutrophils 3.0 1.6 - 8.3 10e3/uL    Absolute Lymphocytes 1.8 0.8 - 5.3 10e3/uL    Absolute Monocytes 0.3 0.0 - 1.3 10e3/uL     Absolute Eosinophils 0.1 0.0 - 0.7 10e3/uL    Absolute Basophils 0.0 0.0 - 0.2 10e3/uL    Absolute Immature Granulocytes 0.1 <=0.4 10e3/uL    Absolute NRBCs 0.0 10e3/uL     Medications   methylPREDNISolone sodium succinate (solu-MEDROL) 1,000 mg in sodium chloride 0.9 % 258 mL intermittent infusion (has no administration in time range)             Critical care was not performed.     Medical Decision Making  The patient's presentation was of moderate complexity (an acute complicated injury).    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   On arrival patient ovular appears presently afebrile and hemodynamically stable.  He is seated upright in a chair and appears nontoxic.  External evaluation demonstrates no obvious cranial nerve deficit other than decrease in right-sided vision.  Intact to light alone.  Extraocular movements intact.  No other appreciable neurovascular deficit.  I have a chance to review notes sent with the patient from retinal specialist with presumptive diagnosis as above with recommendations for neuro-ophthalmology evaluation and question of need of dedicated MRI orbits.    The patient was evaluated by ophthalmology with concern for giant cell arteritis with recommendation for initiation of Solu-Medrol 1 g daily with vascular surgery consultation for consideration of arterial biopsy.  Recommendation for acute inpatient  management for initiation of medications.    I have reviewed the nursing notes.    I have reviewed the findings, diagnosis, plan and need for follow up with the patient.    New Prescriptions    No medications on file       Final diagnoses:   Vision loss of right eye       AVERY KIMBROUGH MD    1/10/2025   [facility] EMERGENCY DEPARTMENT     Avery Kimbrough MD  01/10/25 [id number]       Avery Kimbrough MD  01/10/25 [id number]

## 2025-01-10 NOTE — ED TRIAGE NOTES
PT ambulatory to ED with complaints of vision loss. Vision loss started approx. 2 weeks ago. Pt seen in eye clinic today and was referred to come to ED. Pt denies HA or other neuro changes.       BP (!) 160/89   Pulse 88   Temp 98.4  F (36.9  C)   Resp 16   SpO2 98%        Triage Assessment (Adult)       Row Name 01/10/25 2552          Triage Assessment    Airway WDL WDL        Respiratory WDL    Respiratory WDL WDL        Skin Circulation/Temperature WDL    Skin Circulation/Temperature WDL WDL        Cardiac WDL    Cardiac WDL WDL        Peripheral/Neurovascular WDL    Peripheral Neurovascular WDL WDL        Cognitive/Neuro/Behavioral WDL    Cognitive/Neuro/Behavioral WDL X  vision loss        [place] Coma Scale    Best Eye Response 4-->(E4) spontaneous     Best Motor Response 6-->(M6) obeys commands     Best Verbal Response 5-->(V5) oriented     [place] Coma Scale Score 15

## 2025-01-11 LAB
ANION GAP SERPL CALCULATED.3IONS-SCNC: 14 MMOL/L (ref 7–15)
BUN SERPL-MCNC: 24.8 MG/DL (ref 8–23)
CALCIUM SERPL-MCNC: 9.8 MG/DL (ref 8.8–10.4)
CHLORIDE SERPL-SCNC: 100 MMOL/L (ref 98–107)
CREAT SERPL-MCNC: 1.26 MG/DL (ref 0.67–1.17)
EGFRCR SERPLBLD CKD-EPI 2021: 55 ML/MIN/1.73M2
ERYTHROCYTE [DISTWIDTH] IN BLOOD BY AUTOMATED COUNT: 12.6 % (ref 10–15)
EST. AVERAGE GLUCOSE BLD GHB EST-MCNC: 128 MG/DL
GLUCOSE BLDC GLUCOMTR-MCNC: 139 MG/DL (ref 70–99)
GLUCOSE BLDC GLUCOMTR-MCNC: 149 MG/DL (ref 70–99)
GLUCOSE BLDC GLUCOMTR-MCNC: 171 MG/DL (ref 70–99)
GLUCOSE BLDC GLUCOMTR-MCNC: 175 MG/DL (ref 70–99)
GLUCOSE BLDC GLUCOMTR-MCNC: 196 MG/DL (ref 70–99)
GLUCOSE SERPL-MCNC: 186 MG/DL (ref 70–99)
HBA1C MFR BLD: 6.1 %
HCO3 SERPL-SCNC: 25 MMOL/L (ref 22–29)
HCT VFR BLD AUTO: 34.1 % (ref 40–53)
HGB BLD-MCNC: 11.7 G/DL (ref 13.3–17.7)
MCH RBC QN AUTO: 31.7 PG (ref 26.5–33)
MCHC RBC AUTO-ENTMCNC: 34.3 G/DL (ref 31.5–36.5)
MCV RBC AUTO: 92 FL (ref 78–100)
PLATELET # BLD AUTO: 174 10E3/UL (ref 150–450)
POTASSIUM SERPL-SCNC: 4.2 MMOL/L (ref 3.4–5.3)
RBC # BLD AUTO: 3.69 10E6/UL (ref 4.4–5.9)
SODIUM SERPL-SCNC: 139 MMOL/L (ref 135–145)
WBC # BLD AUTO: 4.2 10E3/UL (ref 4–11)

## 2025-01-11 PROCEDURE — 36415 COLL VENOUS BLD VENIPUNCTURE: CPT

## 2025-01-11 PROCEDURE — 85027 COMPLETE CBC AUTOMATED: CPT

## 2025-01-11 PROCEDURE — 250N000013 HC RX MED GY IP 250 OP 250 PS 637

## 2025-01-11 PROCEDURE — 250N000011 HC RX IP 250 OP 636: Performed by: EMERGENCY MEDICINE

## 2025-01-11 PROCEDURE — 99221 1ST HOSP IP/OBS SF/LOW 40: CPT | Mod: GC | Performed by: SURGERY

## 2025-01-11 PROCEDURE — 120N000002 HC R&B MED SURG/OB UMMC

## 2025-01-11 PROCEDURE — 250N000013 HC RX MED GY IP 250 OP 250 PS 637: Performed by: INTERNAL MEDICINE

## 2025-01-11 PROCEDURE — 82962 GLUCOSE BLOOD TEST: CPT

## 2025-01-11 PROCEDURE — 99233 SBSQ HOSP IP/OBS HIGH 50: CPT | Mod: GC | Performed by: INTERNAL MEDICINE

## 2025-01-11 PROCEDURE — 83036 HEMOGLOBIN GLYCOSYLATED A1C: CPT | Performed by: INTERNAL MEDICINE

## 2025-01-11 PROCEDURE — 258N000003 HC RX IP 258 OP 636: Performed by: EMERGENCY MEDICINE

## 2025-01-11 PROCEDURE — 99223 1ST HOSP IP/OBS HIGH 75: CPT | Mod: GC | Performed by: INTERNAL MEDICINE

## 2025-01-11 PROCEDURE — 80048 BASIC METABOLIC PNL TOTAL CA: CPT

## 2025-01-11 RX ORDER — POLYETHYLENE GLYCOL 3350 17 G/17G
POWDER, FOR SOLUTION ORAL
Status: ON HOLD | COMMUNITY
Start: 2025-01-02 | End: 2025-01-13

## 2025-01-11 RX ORDER — PRAVASTATIN SODIUM 10 MG
10 TABLET ORAL EVERY EVENING
Status: DISCONTINUED | OUTPATIENT
Start: 2025-01-11 | End: 2025-01-13 | Stop reason: HOSPADM

## 2025-01-11 RX ORDER — DEXTROSE MONOHYDRATE 25 G/50ML
25-50 INJECTION, SOLUTION INTRAVENOUS
Status: DISCONTINUED | OUTPATIENT
Start: 2025-01-11 | End: 2025-01-13 | Stop reason: HOSPADM

## 2025-01-11 RX ORDER — VITAMIN B COMPLEX
25 TABLET ORAL DAILY
Status: DISCONTINUED | OUTPATIENT
Start: 2025-01-11 | End: 2025-01-13 | Stop reason: HOSPADM

## 2025-01-11 RX ORDER — ACETAMINOPHEN 325 MG/1
650 TABLET ORAL EVERY 6 HOURS PRN
Status: DISCONTINUED | OUTPATIENT
Start: 2025-01-11 | End: 2025-01-13 | Stop reason: HOSPADM

## 2025-01-11 RX ORDER — BISACODYL 5 MG/1
TABLET, DELAYED RELEASE ORAL
COMMUNITY
Start: 2025-01-02

## 2025-01-11 RX ORDER — POLYETHYLENE GLYCOL 3350 17 G/17G
17 POWDER, FOR SOLUTION ORAL DAILY
Status: DISCONTINUED | OUTPATIENT
Start: 2025-01-11 | End: 2025-01-13 | Stop reason: HOSPADM

## 2025-01-11 RX ORDER — ALLOPURINOL 300 MG/1
1 TABLET ORAL DAILY
COMMUNITY
Start: 2024-02-07 | End: 2025-01-16

## 2025-01-11 RX ORDER — HYDROCHLOROTHIAZIDE 12.5 MG/1
12.5 TABLET ORAL DAILY
COMMUNITY
Start: 2024-02-07

## 2025-01-11 RX ORDER — AMOXICILLIN 250 MG
1 CAPSULE ORAL 2 TIMES DAILY PRN
Status: DISCONTINUED | OUTPATIENT
Start: 2025-01-11 | End: 2025-01-13 | Stop reason: HOSPADM

## 2025-01-11 RX ORDER — AMOXICILLIN 250 MG
2 CAPSULE ORAL 2 TIMES DAILY PRN
Status: DISCONTINUED | OUTPATIENT
Start: 2025-01-11 | End: 2025-01-13 | Stop reason: HOSPADM

## 2025-01-11 RX ORDER — NICOTINE POLACRILEX 4 MG
15-30 LOZENGE BUCCAL
Status: DISCONTINUED | OUTPATIENT
Start: 2025-01-11 | End: 2025-01-13 | Stop reason: HOSPADM

## 2025-01-11 RX ORDER — LISINOPRIL 5 MG/1
20 TABLET ORAL DAILY
Status: DISCONTINUED | OUTPATIENT
Start: 2025-01-11 | End: 2025-01-13

## 2025-01-11 RX ORDER — SULFAMETHOXAZOLE AND TRIMETHOPRIM 800; 160 MG/1; MG/1
1 TABLET ORAL DAILY
Status: DISCONTINUED | OUTPATIENT
Start: 2025-01-11 | End: 2025-01-13 | Stop reason: HOSPADM

## 2025-01-11 RX ORDER — ASPIRIN 81 MG/1
TABLET ORAL
COMMUNITY
Start: 2024-12-22

## 2025-01-11 RX ORDER — PRAVASTATIN SODIUM 10 MG
10 TABLET ORAL EVERY EVENING
Status: CANCELLED | OUTPATIENT
Start: 2025-01-11

## 2025-01-11 RX ORDER — LIDOCAINE 40 MG/G
CREAM TOPICAL
Status: DISCONTINUED | OUTPATIENT
Start: 2025-01-11 | End: 2025-01-13 | Stop reason: HOSPADM

## 2025-01-11 RX ORDER — CALCIUM CARBONATE 500 MG/1
1000 TABLET, CHEWABLE ORAL 4 TIMES DAILY PRN
Status: DISCONTINUED | OUTPATIENT
Start: 2025-01-11 | End: 2025-01-13 | Stop reason: HOSPADM

## 2025-01-11 RX ADMIN — Medication 25 MCG: at 17:54

## 2025-01-11 RX ADMIN — Medication 600 MG: at 17:54

## 2025-01-11 RX ADMIN — SODIUM CHLORIDE 1000 MG: 9 INJECTION, SOLUTION INTRAVENOUS at 21:24

## 2025-01-11 RX ADMIN — LISINOPRIL 20 MG: 20 TABLET ORAL at 07:59

## 2025-01-11 RX ADMIN — SULFAMETHOXAZOLE AND TRIMETHOPRIM 1 TABLET: 800; 160 TABLET ORAL at 17:55

## 2025-01-11 ASSESSMENT — ACTIVITIES OF DAILY LIVING (ADL)
ADLS_ACUITY_SCORE: 48
ADLS_ACUITY_SCORE: 48
ADLS_ACUITY_SCORE: 59
ADLS_ACUITY_SCORE: 48
ADLS_ACUITY_SCORE: 44
ADLS_ACUITY_SCORE: 48
ADLS_ACUITY_SCORE: 48
ADLS_ACUITY_SCORE: 44
ADLS_ACUITY_SCORE: 59
ADLS_ACUITY_SCORE: 48
ADLS_ACUITY_SCORE: 48
ADLS_ACUITY_SCORE: 44
ADLS_ACUITY_SCORE: 48
ADLS_ACUITY_SCORE: 59

## 2025-01-11 NOTE — PROGRESS NOTES
Brief Surgery Note    EGS team to perform temporal artery biopsy (on call Vascular surgery/Dr. Lakhani does not perform). A little unclear if we're able to perform this over the weekend from my discussion with the lab. Ultimately this should be within 5 days of starting steroids which we can accommodate. Patient also with recent Xarelto dose 1/10. Needs to be held at least 48-72 hours for surgery. We will plan to schedule this Monday 1/13. Okay for diet from a surgical standpoint. Please make NPO at [place] 1/13.     Elvia Marroquin MD  PGY-5 General Surgery

## 2025-01-11 NOTE — MEDICATION SCRIBE - ADMISSION MEDICATION HISTORY
"Medication Scribe Admission Medication History    Admission medication history is complete. The information provided in this note is only as accurate as the sources available at the time of the update.    Information Source(s): Patient and CareEverywhere/SureScripts via in-person    Pertinent Information: Patient not sure if if he is taking Lisinopril or last dose stating \"I take all my meds I was told to take\". He also can not recall Allopurinol dosage.    Changes made to PTA medication list:  Added: allopurinol (ZYLOPRIM) 300 MG tablet, aspirin 81 MG EC tablet, bisacodyl (DULCOLAX) 5 MG EC tablet, hydroCHLOROthiazide 12.5 MG tablet, polyethylene glycol (MIRALAX) 17 GM/Dose powder.  Deleted: None  Changed: None    Allergies reviewed with patient and updates made in EHR: yes    Medication History Completed By: Mirlande Beard 1/11/2025 2:45 PM    PTA Med List   Medication Sig Last Dose/Taking    allopurinol (ZYLOPRIM) 300 MG tablet Take 1 tablet by mouth daily. Patient unsure of the dosage (100 mg or 300 mg) 1/9/2025 Evening    aspirin 81 MG EC tablet Take 1 Tablet (81 mg) by mouth once daily with a meal. 1/10/2025 Morning    bisacodyl (DULCOLAX) 5 MG EC tablet Take 2 Tablets (10 mg) by mouth once daily if needed for Constipation. Unknown    hydroCHLOROthiazide 12.5 MG tablet Take 12.5 mg by mouth daily. 1/9/2025 Evening    polyethylene glycol (MIRALAX) 17 GM/Dose powder Mix 1 scoop (17 g) in liquid then take by mouth once daily. 1/8/2025 Morning    acetaminophen (TYLENOL) 325 MG tablet Take 2 tablets (650 mg) by mouth every 6 hours as needed for mild pain More than a month    chlorproMAZINE (THORAZINE) 25 MG tablet Take 1-2 tablets (25-50 mg) by mouth 3 times daily as needed (hiccups) Unknown    Lisinopril (ZESTRIL PO) Take 20 mg by mouth daily Unknown    oxyCODONE IR (ROXICODONE) 5 MG tablet Take 1 tablet (5 mg) by mouth every 3 hours as needed for other (pain control or improvement in physical function. Hold " dose for analgesic side effects.) More than a month    Pravastatin Sodium (PRAVACHOL PO) Take 10 mg by mouth every evening 1/9/2025 Evening

## 2025-01-11 NOTE — CONSULTS
OPHTHALMOLOGY CONSULT NOTE  01/10/25    Patient: Lobito David    I have not seen or examined the patient, but was available if the need had arisen. 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 Normal PLT level. ROS of GCA negative.    Plan:  MRI orbit and brain wwo contrast  Start IV Solumedrol 1 gr / 3 - 5 days  Rheumatology consult  Temporal artery biopsy, RIGHT side ASAP -  vascular surgery consult    I reviewed the patients MRI personally and agree with the radiologist. There is no optic nerve or sheath enhancement      Please direct all questions or concerns to the on-call ophthalmology resident.    Jeff Reis MD   Fellow, Neuro-ophthalmology         ASSESSMENT/PLAN:     Lobito David is a(n) 89 y.o. with a history of stage III CKD, malignant neoplasm of urinary bladder neck, prostate cancer, Hx pulmonary embolism, hypertension, hyperlipidemia. Patient presents from [facility] for evaluation of right optic disc edema.    #Pallid optic disc edema, right eye  Va Hand motion right eye, 20/25 left eye, 3+ APD of the right eye, IOP 14/10, color vision 0/11 right eye. Anterior segment exam of the right eye notable for chronic appearing iris atrophy and dense posterior capsular opacification. Anterior segment exam of the left eye with no acute pathologic findings. Dilated exam of the right eye notable for disc pallor, inferior pallid disc edema with surrounding peripapillary flame hemorrhages. Right macula with blunted foveal light reflex and boggy appearance. Dilated exam of the left eye with pallorous disc appearance but no appreciable disc edema or heme. Broad differential of unilateral optic  disc edema was considered including but not limited to atypical optic neuritis, compressive optic neuropathy, non-arteritic ischemic optic neuropathy, arteritic ischemic optic neuropathy/giant cell arteritis. At this time, given patients age, elevated ESR (72), patchy choroidal filling on fundus fluorescein angiography (can be seen in GCA/AAION) a diagnosis of AAION/GCA must be excluded.    PLAN:  -MRI orbits with and without contrast  -1g daily of  IV solumedrol 3 - 5 doses  -Consult vascular surgery for urgent temporal artery biopsy  -Rheumatology consult to help with pertinent rheumatologic assessment/workup  -Ophthalmology will follow closely will admitted  -Please contact ophthalmologist on call with any questions or concerns. We appreciate your care of this patient.        Discussed with , neuro-ophthalmology fellow, who agreed with this assessment and plan.       Thank you for entrusting us with your care  Chasity Choe MD, PGY2  Ophthalmology Resident  [facility]    HISTORY OF PRESENTING ILLNESS:     Lobito David is a 89 year old male who presented on 1/10/25 from [facility] retina appt with concern for GCA/AAION of the right eye. Flyn was recently hospitalized on 12/21/24-12/22/24 for extensive stroke workup at [facility]. Patient states he developed sudden painless right eye vision loss while driving on 12/20/24. This prompted ED visit the following day and the aforementioned hospital admission. Stroke work up came back unremarkable and the patient was discharged with close ophthalmology follow up. Was seen at [facility] 1 week ago where it was noted that he had right optic disc edema and macular edema. No treatment was initiated, however patient was given close follow up. He followed up with [facility] today, at which time  fluorescein angiography testing was performed and demonstrated findings concerning for giant cell arteritis. Patient was told to present to [facility] ED for  further evaluation.    Patient denies fevers, chills, weight loss, jaw claudication, arthralgias, scalp tenderness, headaches, pain with eye movements. Left eye is asymptomatic and at it's visual baseline.      10+ review of systems were otherwise negative except for that which has been stated above.      OCULAR/MEDICAL/SURGICAL HISTORIES:     Past Ocular History:   Last eye exam: 1/10/25   Previously diagnosed ocular conditions: right disc edema, choroidal nevus left eye, PED   Prior eye surgery/laser: CE/IOL OU  Contact lens wear: None  Glasses: Yes  Eyedrops: None    Pertinent Systemic Medications:   Current Facility-Administered Medications   Medication Dose Route Frequency Provider Last Rate Last Admin    gadobutrol (GADAVIST) injection 9 mL  9 mL Intravenous Once Avery Loja MD        methylPREDNISolone sodium succinate (solu-MEDROL) 1,000 mg in sodium chloride 0.9 % 283 mL intermittent infusion  1,000 mg Intravenous Q24H Avery Loja MD         Current Outpatient Medications   Medication Sig Dispense Refill    acetaminophen (TYLENOL) 325 MG tablet Take 2 tablets (650 mg) by mouth every 6 hours as needed for mild pain 40 tablet 0    chlorproMAZINE (THORAZINE) 25 MG tablet Take 1-2 tablets (25-50 mg) by mouth 3 times daily as needed (hiccups) 15 tablet 0    Lisinopril (ZESTRIL PO) Take 20 mg by mouth daily      oxyCODONE IR (ROXICODONE) 5 MG tablet Take 1 tablet (5 mg) by mouth every 3 hours as needed for other (pain control or improvement in physical function. Hold dose for analgesic side effects.) 60 tablet 0    Pravastatin Sodium (PRAVACHOL PO) Take 10 mg by mouth every evening      rivaroxaban ANTICOAGULANT (XARELTO) 10 MG TABS tablet Take 1 tablet (10 mg) by mouth daily 30 tablet 0           Past Medical History:  Past Medical History:   Diagnosis Date    AAA (abdominal aortic aneurysm)     Acute diverticulitis     Arthritis     Erectile dysfunction     Hyperlipidemia     Hypertension      Low back pain     Shoulder disorder        Past Surgical History:   Past Surgical History:   Procedure Laterality Date    ARTHROPLASTY HIP Right 5/21/2018    Procedure: ARTHROPLASTY HIP;  Right total hip arthroplasty;  Surgeon: Dale Mcghee MD;  Location:  OR    COLONOSCOPY      OPTICAL TRACKING SYSTEM FUSION POSTERIOR SPINE LUMBAR N/A 2/2/2015    Procedure: OPTICAL TRACKING SYSTEM FUSION SPINE POSTERIOR LUMBAR ONE LEVEL;  Surgeon: Shakir Vasquez MD;  Location:  OR    ORTHOPEDIC SURGERY      femur knee, bilat    ORTHOPEDIC SURGERY      right shoulder arthroscopy    ORTHOPEDIC SURGERY Left     hip surgery, muscles tendon repair       Family History:   No history of macular degeneration or glaucoma    Social History:   No tobacco use    EXAMINATION:     Fundus fluorescein angiography:  Right eye:      Left eye:            Base Eye Exam       Visual Acuity (Snellen - Linear)         Right Left    Near sc HM               Tonometry (Tonopen)         Right Left    Pressure 14 11              Pupils         APD    Right 3+  APD    Left               Visual Fields         Left Right     Full     Restrictions  Total superior temporal, inferior temporal, superior nasal, inferior nasal deficiencies              Extraocular Movement         Right Left     Full Full              Dilation       Both eyes: 2.5% Rayshawn Synephrine, 1.0% Mydriacyl                   Additional Tests       Color         Right Left    [place] 0/11 11/11                  Slit Lamp and Fundus Exam       External Exam         Right Left    External Normal Normal              Slit Lamp Exam         Right Left    Lids/Lashes Normal Normal    Conjunctiva/Sclera White and quiet White and quiet    Cornea Clear Clear    Anterior Chamber Deep and quiet Deep and quiet    Iris iris atrophy superiorly and temporally with TIDs Round and reactive    Lens PCIOL, dense PCO PCIOL    Anterior Vitreous syneresis PVD              Fundus Exam         Right Left     "Disc pallorous nerve, inferior disc edema with inferotemporal and superonasal flame hemorrhages pallorous nerve, no edema or heme    C/D Ratio  0.2    Macula Normal Drusen    Vessels Normal Normal    Periphery Normal Normal                    Labs/Studies/Imaging Performed  MRI Brain w/o contrast (12/22):  \"1. No evidence of acute intracranial abnormality.   2. Mild generalized cerebral volume loss, and mild chronic microangiopathy changes.\"     CTA Head & Neck (12/21):  \"CTA Head:  No intracranial proximal large vessel occlusion, flow-limiting luminal stenosis, or cerebral aneurysm.  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 Additionally, there is a left carotid web versus thrombus adherent to the plaque at the level of the distal carotid bulb.\"       Chasity Choe MD  Resident Physician, PGY2  Department of Ophthalmology           "

## 2025-01-11 NOTE — H&P
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 Etiology concerning for GCA/AAION versus less likely retinal artery occlusion given normal orbit MRI 1/10/24, with no acute infarct. Symptoms started 12/20. Patient had CT head 12/21 and MRI 12/22 negative for stroke. CTA head and neck 12/22 showing no LVO, AVM or cerebral aneurysm, but found 50% stenosis of left ICA 2/2 predominantly soft atherosclerotic plaque and noting an ulcerative plaque at the level of the carotid bulb. CRP normal, ESR elevated to 72. No leukocytosis. MRI orbits 1/10/25 normal, no acute infarct. Neuro exam normal, except right eye visual deficits. Ophthalmology saw patient in ED and concerned for possible GCA, IV solumedrol started. Will admit for IV steroids and possible temporal artery biopsy by vascular surgery. Will consult rheum.  - IV methylprednisolone 1g daily  - Ophthalmology consulted in ED and following, appreciate recs   - MRI orbits with and without contrast  - START 1g daily of  IV solumedrol   - Consult vascular surgery for urgent temporal artery biopsy  - Rheumatology consult to help with pertinent rheumatologic assessment/workup  - Ophthalmology will follow closely   - Vascular surgery consult for temporal artery  "biopsy  - Rheumatology consult  - NPO pending potential procedure by vascular surgery  - Daily CBC, BMP    #CKD III  Creatinine 1.36 on admission, at baseline. Continue to monitor.  - Daily BMP                                                                                                            #HTN  - Continue PTA lisinopril 20 mg po daily    #HLD  - Continue PTA pravastatin 10 mg po every evening    #Chronic Normocytic anemia  Hgb stable, continue to monitor          Diet:  NPO  DVT Prophylaxis: Pneumatic Compression Devices  Palmer Catheter: Not present  Fluids: None  Lines: None     Cardiac Monitoring: None  Code Status:  Full code    Clinically Significant Risk Factors Present on Admission                # Drug Induced Coagulation Defect: home medication list includes an anticoagulant medication    # Hypertension: Home medication list includes antihypertensive(s)           # Overweight: Estimated body mass index is 28.06 kg/m  as calculated from the following:    Height as of this encounter: 1.753 m (5' 9\").    Weight as of this encounter: 86.2 kg (190 lb).              Disposition Plan      Expected Discharge Date: 01/12/2025                The patient's care was discussed with the Attending Physician, Dr. Pabon .      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    Reports he was [place] to the airport on " 12/20/24 and rubbed his eye and suddenly was unable to see the brake lights of the car in front of him on the right side. He states the loss of vision has been stable since that time but fluctuates between times when he can see clearly with his right eye, and times when his vision is very dim. He also notes right lateral peripheral blurriness that is constant. He denied any other symptoms and specifically denied jaw claudication, temporal artery pain, low grade fevers, fatigue, weight loss, shoulder weakness/pain, focal weakness or sensation changes.     Following onset of his symptoms, patient was admitted to [facility] on 12/21/24 through 12/22/24 for work-up, during which he had a CT of his head and MRI brain with no acute stroke or findings to explain vision loss. He had a CTA head and neck negative for LVO, stenosis or aneurysm with 50% occlusion of L ICA. He had a LALA showing normal LV EF and no embolic source. 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 He saw outpatient ophthalmology 1/10/25, who referred patient to [facility] ED for evaluation.    He states he had a URI at the end of December 2024 with cough but that has since resolved. Notably, patient had ED visit 1/02/25 for unexplained episode of hematuria and flank pain. No stones were found on imaging and he was discharged without intervention.    He denies fevers, chills, weight loss, CP, SOB, myalgia, joint pain or new rashes.      Past Medical History    Past Medical History:   Diagnosis Date    AAA (abdominal aortic aneurysm)     Acute diverticulitis     Arthritis     Erectile dysfunction     Hyperlipidemia     Hypertension     Low back pain     Shoulder disorder        Past Surgical History   Past Surgical History:   Procedure Laterality Date    ARTHROPLASTY HIP Right 5/21/2018    Procedure: ARTHROPLASTY HIP;  Right total hip arthroplasty;  Surgeon:  Dale Mcghee MD;  Location:  OR    COLONOSCOPY      OPTICAL TRACKING SYSTEM FUSION POSTERIOR SPINE LUMBAR N/A 2/2/2015    Procedure: OPTICAL TRACKING SYSTEM FUSION SPINE POSTERIOR LUMBAR ONE LEVEL;  Surgeon: Shakir Vasquez MD;  Location:  OR    ORTHOPEDIC SURGERY      femur knee, bilat    ORTHOPEDIC SURGERY      right shoulder arthroscopy    ORTHOPEDIC SURGERY Left     hip surgery, muscles tendon repair       Prior to Admission Medications   Prior to Admission Medications   Prescriptions Last Dose Informant Patient Reported? Taking?   Lisinopril (ZESTRIL PO)   Yes No   Sig: Take 20 mg by mouth daily   Pravastatin Sodium (PRAVACHOL PO)   Yes No   Sig: Take 10 mg by mouth every evening   acetaminophen (TYLENOL) 325 MG tablet   No No   Sig: Take 2 tablets (650 mg) by mouth every 6 hours as needed for mild pain   chlorproMAZINE (THORAZINE) 25 MG tablet   No No   Sig: Take 1-2 tablets (25-50 mg) by mouth 3 times daily as needed (hiccups)   oxyCODONE IR (ROXICODONE) 5 MG tablet   No No   Sig: Take 1 tablet (5 mg) by mouth every 3 hours as needed for other (pain control or improvement in physical function. Hold dose for analgesic side effects.)   rivaroxaban ANTICOAGULANT (XARELTO) 10 MG TABS tablet   No No   Sig: Take 1 tablet (10 mg) by mouth daily      Facility-Administered Medications: None        Review of Systems    The 10 point Review of Systems is negative other than noted in the HPI.    Social History   I have reviewed this patient's social history and updated it with pertinent information if needed.  Social History     Tobacco Use    Smoking status: Former    Smokeless tobacco: Never    Tobacco comments:     Quit 44 yrs ago   Substance Use Topics    Alcohol use: Yes     Comment: rarely    Drug use: No         Family History   I have reviewed this patient's family history and updated it with pertinent information if needed.  Family History   Problem Relation Age of Onset    Cardiovascular Sister          cervical    Cancer Sister          Allergies   No Known Allergies     Physical Exam   Vital Signs: Temp: 98  F (36.7  C) Temp src: Oral BP: (!) 175/100 Pulse: 78   Resp: 20 SpO2: 99 % O2 Device: None (Room air)    Weight: 190 lbs 0 oz    Constitutional: awake, alert, cooperative, no apparent distress, and appears stated age  Eyes: Lids and lashes normal, pupils equal, round and reactive to light, extra ocular muscles intact, sclera clear, conjunctiva normal, EOMI and symmetrical. Right peripheral field visual deficit to confrontation.  ENT: Normocephalic, without obvious abnormality, atraumatic, sinuses nontender on palpation, external ears without lesions, oral pharynx with moist mucous membranes, tonsils without erythema or exudates, gums normal and dentures noted.  Respiratory: No increased work of breathing, good air exchange, clear to auscultation bilaterally, no crackles or wheezing  Cardiovascular: Normal apical impulse, regular rate and rhythm, normal S1 and S2, no S3 or S4, and no murmur noted  GI: Normal bowel sounds, soft, non-distended, non-tender, no masses palpated, no hepatosplenomegally  Skin: no bruising or bleeding, no rashes, and no lesions  Musculoskeletal: There is no redness, warmth, or swelling of the joints.  Full range of motion noted.  Tone is normal.  Neurologic: Awake, alert, oriented to name, place and time.  Cranial nerves II-XII are grossly intact.  Motor is 5 out of 5 bilaterally.  Cerebellar finger to nose, heel to shin intact.  Sensory is intact.  Romberg negative, and gait is normal.      Data     I have personally reviewed the following data over the past 24 hrs:    5.3  \   11.9 (L)   / 177     138 99 27.7 (H) /  101 (H)   4.3 26 1.36 (H) \     Procal: N/A CRP: 3.85 Lactic Acid: N/A         Imaging results reviewed over the past 24 hrs:   Recent Results (from the past 24 hours)   MR Orbit w/o & w Contrast    Narrative    EXAM: MR ORBIT W/O and W CONTRAST  LOCATION: [facility]  DATE: 1/10/2025    INDICATION: R monocular vision loss, request per Ophtho  COMPARISON: None.  CONTRAST: gadobutrol (GADAVIST) injection 9 mL  TECHNIQUE: Multiplanar multisequence dedicated orbit MRI without and with intravenous contrast.    FINDINGS:   RIGHT ORBIT: Normal periorbital soft tissues, bony orbit, globe, extraocular muscles, optic nerve/sheath, periorbital fat and lacrimal gland.     LEFT ORBIT: Normal periorbital soft tissues, bony orbit, globe, extraocular muscles, optic nerve/sheath, periorbital fat and lacrimal gland.    SINUSES: No paranasal sinus mucosal disease.    VISUALIZED INTRACRANIAL CONTENTS: No acute findings. Mild chronic periventricular white matter changes suggestive of microvascular ischemia, with concordant mild to moderate volume loss.       Impression    IMPRESSION:   1.  Normal orbit MRI.  2.  No acute infarct.  3.  Chronic age-related intracranial white matter microvascular ischemic changes with mild/moderate volume loss.

## 2025-01-11 NOTE — PLAN OF CARE
Goal Outcome Evaluation:      Plan of Care Reviewed With: patient    Overall Patient Progress: no changeOverall Patient Progress: no change     [id number]    Pt transferred up to 6A at approx 1830. Report given to MARY BETH Jenkins, pt sent w all belongings, dinner ordered for pt.    While pt was with this RN:  ANIKA&Vale, VSS on RA, pt denies pain. R eye still w darkened and spotty vision, can still somewhat see out of eye, no new changes to vision throughout shift. Started on ACHS BG checks today, first insulin dose will be given with dinner if needed. Up indp, pt walked frequently during shift. Voids spont, no BM this shift. Waiting for eye biopsy on Monday 1/13, receiving solumedrol q24 hrs in meantime to manage.

## 2025-01-11 NOTE — PLAN OF CARE
"Goal Outcome Evaluation:BP (!) 147/86 (BP Location: Left arm, Patient Position: Supine, Cuff Size: Adult Regular)   Pulse 78   Temp 97.6  F (36.4  C) (Oral)   Resp 18   Ht 1.753 m (5' 9\")   Wt 86.2 kg (190 lb)   SpO2 94%   BMI 28.06 kg/m      Shift:[id number]  Reason for admission: R eye vision loss   Vitals: VSS  Activity: Independent   Pain: Denies pain.   Neuro: Alert and oriented x4. Continues to experience R eye vision loss.    Cardiac:Denies chest pain.     Respiratory:Sating >92% RA. Denies shortness of breath.   GI/: Denies difficulties urinating . Last BM 1/10.Denies N/V.    Diet:NPO for possible temporal biopsy.    Lines: L PIV;SL.                         "

## 2025-01-11 NOTE — PROGRESS NOTES
[facility]    Progress Note - Medicine Service, MAROON TEAM 4       Date of Admission:  1/10/2025    Assessment & Plan   Lobito David is a 89 year old male with PMH of CKD III, malignant neoplasm bladder neck and prostate, PE, HTN, HLD admitted on 1/10/2025. He presents with 2 weeks of painless acute right eye vision loss with prior imaging negative for stroke. Elevated ESR concerning for inflammatory etiology of vision loss including and not limited to GCA/AAION.    Acute right eye vision loss concerning for giant cell arteritis or other inflammatory process  Elevated ESR  Patient with 2 weeks of painless, acute right eye vision loss which is stable with paroxysmal blurriness and periods of improved vision, with blurry right peripheral visual field. Etiology concerning for GCA/AAION versus less likely retinal artery occlusion given normal orbit MRI 1/10/24, with no acute infarct. Symptoms started 12/20. Patient had CT head 12/21 and MRI 12/22 negative for stroke. CTA head and neck 12/22 showing no LVO, AVM or cerebral aneurysm, but found 50% stenosis of left ICA 2/2 predominantly soft atherosclerotic plaque and noting an ulcerative plaque at the level of the carotid bulb. CRP normal, ESR elevated to 72. No leukocytosis. MRI orbits 1/10/25 normal, no acute infarct. Neuro exam normal, except right eye visual deficits. Ophthalmology saw patient in ED and concerned for possible GCA.  - IV methylpred 1g daily  - Opthomalmology consulted, closely following, appreciate recs  - MRI orbits w and w/o; completed  - steroids as above  - Consult surgery for TAB  - Rheum consult, ordered and pending recs  - EGS will complete TAB on 1/13 as of note from 1/11. Will be NPO midnight 1/13 for procedure.  - Hold any AC for procedure  - Hold statins, NSAIDs pending further GCA workup    Hyperglycemia due to steroids  History of prediabetes  BG of 196 this AM while NPO. Likely secondary  "to high dose steroids. HBG A1c 6.1 1/11. This is in pre-diabetic range. Likely will continue to have elevated BG.  - Q4 BG checks.  - sliding scale insulin ordered. Will adjust insulin to regimen for discharge (50 % basal and mealtime)    CKD III  Creatinine 1.36 on admission, at baseline. Continue to monitor.  - Daily BMP                                                                                                             HTN  - PTA lisinopril 20 mg po daily held in preparation of surgery. Will monitor BP and reorder if HTN.     HLD  - Hold PTA pravastatin 10 mg po every evening for GCA workup. Hold if workup consistent with GCA.     Chronic Normocytic anemia  Hgb stable, continue to monitor, daily CBC            Diet: NPO for Medical/Clinical Reasons Except for: Meds    DVT Prophylaxis: Ambulate every shift and VTE Prophylaxis contraindicated due to surgery in less than 48 hours  Palmer Catheter: Not present  Fluids: None  Lines: None     Cardiac Monitoring: None  Code Status: Full Code      Clinically Significant Risk Factors Present on Admission                # Drug Induced Coagulation Defect: home medication list includes an anticoagulant medication    # Hypertension: Home medication list includes antihypertensive(s)           # Overweight: Estimated body mass index is 28.06 kg/m  as calculated from the following:    Height as of this encounter: 1.753 m (5' 9\").    Weight as of this encounter: 86.2 kg (190 lb).              Social Drivers of Health   Tobacco Use: Medium Risk (2/9/2024)    [facility]    Patient History     Smoking Tobacco Use: Former     Smokeless Tobacco Use: Never     Passive Exposure: Past         Disposition Plan     Medically Ready for Discharge: Anticipated in 2-4 Days         The patient's care was discussed with the Dr. Dey.    Julian Miles  Medical Student  Medicine Service, [address]" [facility]  Securely message with Vocera (more info)  Text page via [facility] Paging/Directory   See signed in provider for up to date coverage information        Physician Attestation   I, Janay Dey MD, was present with the medical/VICENTE student who participated in the service and in the documentation of the note.  I have verified the history and personally performed the physical exam and medical decision making.  I agree with the assessment and plan of care as documented in the note.      Please see A&P for additional details of medical decision making.    I have personally reviewed the following data over the past 24 hrs:    4.2  \   11.7 (L)   / 174     139 100 24.8 (H) /  175 (H)   4.2 25 1.26 (H) \     TSH: N/A T4: N/A A1C: 6.1 (H)     Procal: N/A CRP: 3.85 Lactic Acid: N/A           Janay Dey MD  Date of Service (when I saw the patient): 01/11/25    ______________________________________________________________________    Interval History   Patient reports mild improvement in his vision this morning. Completely negative ROS otherwise including headaches, fevers, chills, weakness, numbness, tingling, lightheadedness, left eye vision loss, eye pain, balance problems, vertigo, etc. In further questioning patient reports that he does not recall specifically when he lost his vision but first remembers noticing it while driving to the airport two weeks ago that when he closed his left eye he could not see the car in front of him with his right eye. Presented to ED after that for stroke workup. Minimal improvement in vision since then until presenting to the ED here yesterday and receiving steroids. Of note, patient requires hearing aids.    Physical Exam   Vital Signs: Temp: 97.7  F (36.5  C) Temp src: Oral BP: 124/82 Pulse: 84   Resp: 18 SpO2: 94 % O2 Device: None (Room air)    Weight: 190 lbs 0 oz    General Appearance: Well appearing, non-toxic, grossly atraumatic  Respiratory:  non-labored breathing on room air  HEENT: Patient peripheral field cut off. Reports seeing only fingertips and blurred when peripheral field approached. When asked to count number of fingers, able to appreciate number of fingers correctly towards center of visual field about arms length away, but otherwise unable to report number of fingers, only seeing a blurry hand. Atraumatic appearance. Conjunctiva w/o injection or icterus.  Cardiovascular: normal rhythm, regular rate, no murmurs appreciated  Skin: No rashes grossly appreciated including to face  Neuro: 5 out 5 strength in lower and upper extremities. Normal and equal sensation to light touch face, upper and lower extremities.    Medical Decision Making       Please see A&P for additional details of medical decision making.      Data     I have personally reviewed the following data over the past 24 hrs:    4.2  \   11.7 (L)   / 174     139 100 24.8 (H) /  196 (H)   4.2 25 1.26 (H) \     TSH: N/A T4: N/A A1C: 6.1 (H)     Procal: N/A CRP: 3.85 Lactic Acid: N/A         Imaging results reviewed over the past 24 hrs:   Recent Results (from the past 24 hours)   MR Orbit w/o & w Contrast    Narrative    EXAM: MR ORBIT W/O and W CONTRAST  LOCATION: [facility]  DATE: 1/10/2025    INDICATION: R monocular vision loss, request per Ophtho  COMPARISON: None.  CONTRAST: gadobutrol (GADAVIST) injection 9 mL  TECHNIQUE: Multiplanar multisequence dedicated orbit MRI without and with intravenous contrast.    FINDINGS:   RIGHT ORBIT: Normal periorbital soft tissues, bony orbit, globe, extraocular muscles, optic nerve/sheath, periorbital fat and lacrimal gland.     LEFT ORBIT: Normal periorbital soft tissues, bony orbit, globe, extraocular muscles, optic nerve/sheath, periorbital fat and lacrimal gland.    SINUSES: No paranasal sinus mucosal disease.    VISUALIZED INTRACRANIAL CONTENTS: No acute findings. Mild chronic periventricular  white matter changes suggestive of microvascular ischemia, with concordant mild to moderate volume loss.       Impression    IMPRESSION:   1.  Normal orbit MRI.  2.  No acute infarct.  3.  Chronic age-related intracranial white matter microvascular ischemic changes with mild/moderate volume loss.

## 2025-01-11 NOTE — CONSULTS
[facility]    Consult  - Emergency General Surgery Service       Date of Admission:  1/10/2025   on * No surgery found *  Assessment & Plan: Surgery   Lobito David is a 89 year old male with hx of AAA, diverticulitis and HTN who was referred through the ED for a temporal artery biopsy for concern for giant cell arteritis. Symptomatically, the patient has only noticed reduced vision in his right eye. Patient has home xarelto in his problem list    - Admit to medicine service  - Hold xarelto  - Will doscuss biopsy timing with staff in AM  - EGS will continue to follow     The patient's care was discussed with the Patient and will be discussed with chief/staff in the AM    Juan Baker MD  [facility]  All communications related to this note should be expressed to resident/VICENTE on call for this team at the time of your communication. Please be advised that not all members of this team utilize vocera.  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     General surgery was consulted for consideration of temporal artery biopsy.  Of note, patient does take xarelto.    Patient reports he noticed right eye vision loss about two weeks ago. He denies any other associated symptoms. He feels this vision loss has been unchanged for the past two weeks. He was being worked up and they referred him to our facility for the biopsy.      Past Medical History    Past Medical History:   Diagnosis Date    AAA (abdominal aortic aneurysm)      Acute diverticulitis     Arthritis     Erectile dysfunction     Hyperlipidemia     Hypertension     Low back pain     Shoulder disorder        Past Surgical History   Past Surgical History:   Procedure Laterality Date    ARTHROPLASTY HIP Right 5/21/2018    Procedure: ARTHROPLASTY HIP;  Right total hip arthroplasty;  Surgeon: Dale Mcghee MD;  Location: [facility] OR    COLONOSCOPY      OPTICAL TRACKING SYSTEM FUSION POSTERIOR SPINE LUMBAR N/A 2/2/2015    Procedure: OPTICAL TRACKING SYSTEM FUSION SPINE POSTERIOR LUMBAR ONE LEVEL;  Surgeon: Shakir Vasquez MD;  Location:  OR    ORTHOPEDIC SURGERY      femur knee, bilat    ORTHOPEDIC SURGERY      right shoulder arthroscopy    ORTHOPEDIC SURGERY Left     hip surgery, muscles tendon repair       Prior to Admission Medications   Prior to Admission Medications   Prescriptions Last Dose Informant Patient Reported? Taking?   Lisinopril (ZESTRIL PO)   Yes No   Sig: Take 20 mg by mouth daily   Pravastatin Sodium (PRAVACHOL PO)   Yes No   Sig: Take 10 mg by mouth every evening   acetaminophen (TYLENOL) 325 MG tablet   No No   Sig: Take 2 tablets (650 mg) by mouth every 6 hours as needed for mild pain   chlorproMAZINE (THORAZINE) 25 MG tablet   No No   Sig: Take 1-2 tablets (25-50 mg) by mouth 3 times daily as needed (hiccups)   oxyCODONE IR (ROXICODONE) 5 MG tablet   No No   Sig: Take 1 tablet (5 mg) by mouth every 3 hours as needed for other (pain control or improvement in physical function. Hold dose for analgesic side effects.)   rivaroxaban ANTICOAGULANT (XARELTO) 10 MG TABS tablet   No No   Sig: Take 1 tablet (10 mg) by mouth daily      Facility-Administered Medications: None         Physical Exam   Vital Signs: Temp: 98  F (36.7  C) Temp src: Oral BP: (!) 169/89 Pulse: 78   Resp: 20 SpO2: 99 % O2 Device: None (Room air)    Weight: 190 lbs 0 ozNo intake or output data in the 24 hours ending 01/11/25 0303     Gen: Alert and conversational adult male in NAD  Eyes:  Nonicteric  ENT: Mucous Membranes Moist  Pulm: Nonlabored Breathing on RA  CV: RRR by radial palpation  Abd: Soft, nontender, nondistended, without masses or peritoneal signs  : No jones present  Skin: WWP  Extremities: No peripheral edema  Neuro: CN grossly intact, no focal deficits  Psych: Appropriate in conversation         Data     I have personally reviewed the following data over the past 24 hrs:    5.3  \   11.9 (L)   / 177     138 99 27.7 (H) /  101 (H)   4.3 26 1.36 (H) \     Procal: N/A CRP: 3.85 Lactic Acid: N/A         Imaging results reviewed over the past 24 hrs:   Recent Results (from the past 24 hours)   MR Orbit w/o & w Contrast    Narrative    EXAM: MR ORBIT W/O and W CONTRAST  LOCATION: [facility]  DATE: 1/10/2025    INDICATION: R monocular vision loss, request per Ophtho  COMPARISON: None.  CONTRAST: gadobutrol (GADAVIST) injection 9 mL  TECHNIQUE: Multiplanar multisequence dedicated orbit MRI without and with intravenous contrast.    FINDINGS:   RIGHT ORBIT: Normal periorbital soft tissues, bony orbit, globe, extraocular muscles, optic nerve/sheath, periorbital fat and lacrimal gland.     LEFT ORBIT: Normal periorbital soft tissues, bony orbit, globe, extraocular muscles, optic nerve/sheath, periorbital fat and lacrimal gland.    SINUSES: No paranasal sinus mucosal disease.    VISUALIZED INTRACRANIAL CONTENTS: No acute findings. Mild chronic periventricular white matter changes suggestive of microvascular ischemia, with concordant mild to moderate volume loss.       Impression    IMPRESSION:   1.  Normal orbit MRI.  2.  No acute infarct.  3.  Chronic age-related intracranial white matter microvascular ischemic changes with mild/moderate volume loss.

## 2025-01-11 NOTE — CONSULTS
[facility] Rheumatology IP Consult    Consult for: 2 weeks of painless right vision loss c/f giant cell arterities    ASSESSMENT AND RECOMMENDATIONS:  Mr. Robin is a 89 yo M. 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 He was started on pulse dose steroids with some improvement on his right eye vision changes.     He denies any other symptoms of GCA or PMR like temporal headaches or sensitivity, jaw claudication or bilateral shoulder or hip stiffness, pain or weakness. However, GCA is high in the differential. We agree with ophthalmology plan of pulse dose steroids and temporal artery. Will plan to start tocilizumab as outpatient  after biopsy results. If biopsy is negative other causes should be evaluated.     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 Medrano. Out clinic will contact patient.   -- Rheumatology will continue to follow     I discussed the findings and recommendations with the patient.  I communicated the assessment and plan to the consulting team.    Case seen and discussed with Dr. Medrano.     Tiago Reyes-Castro, MD  PGY-4 Rheumatology Fellow  [phone] [text page]      Attending  "Note: I saw and evaluated the patient with Dr. Reyes-Castro. I agree with the assessment and plan. Per Patient's request, called his wife to give update, left a message. Will try again tomorrow.      TT 80 min was spent on date of the encounter doing chart review, history and exam, documentation and further activities as noted above. Any prior notes, outside records, laboratory results, and imaging studies were reviewed if relevant.      Mylene Medrano MD    History of Present Illness   Lobito David is a 89 year old male admitted on 1/10/2025. 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     Patient reports intermittent episodes of blurry vision that started 2 months ago, and then he had one 6 weeks ago but at that time he did not notice any vision loss. He noticed vision loss 2 weeks ago when he was rubbing his left eye. At that time he noticed decrease vision acuity and blurry vision, he thinks that is has been almost the same since then. Denies any headaches, fever, weight loss, fatigue, jaw claudications, myalgias, stiffness or weakness.     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 He was started on pulse dose steroids with some improvement on his right eye vision changes.       Medication Safety and Monitoring:     No results found for: \"HBCAB\", \"[id number]\", \"HCABC\", \"HCVAB\", \"TBRES\"  Immunization History   Administered Date(s) Administered    COVID-19 12+ (Pfizer) 10/29/2023, 09/20/2024    COVID-19 Bivalent 12+ (Pfizer) 10/03/2022    COVID-19 MONOVALENT 12+ (Pfizer) 02/18/2021, 03/11/2021, 10/01/2021    COVID-19 Monovalent 12+ (Pfizer 2022) 04/01/2022 "       Review of Systems    Unless stated above, a 14-point review of systems was otherwise normal.     Past Medical History   Past Medical History:   Diagnosis Date    AAA (abdominal aortic aneurysm)     Acute diverticulitis     Arthritis     Erectile dysfunction     Hyperlipidemia     Hypertension     Low back pain     Shoulder disorder      Past Surgical History:   Procedure Laterality Date    ARTHROPLASTY HIP Right 5/21/2018    Procedure: ARTHROPLASTY HIP;  Right total hip arthroplasty;  Surgeon: Dale Mcghee MD;  Location:  OR    COLONOSCOPY      OPTICAL TRACKING SYSTEM FUSION POSTERIOR SPINE LUMBAR N/A 2/2/2015    Procedure: OPTICAL TRACKING SYSTEM FUSION SPINE POSTERIOR LUMBAR ONE LEVEL;  Surgeon: Shakir Vasquez MD;  Location:  OR    ORTHOPEDIC SURGERY      femur knee, bilat    ORTHOPEDIC SURGERY      right shoulder arthroscopy    ORTHOPEDIC SURGERY Left     hip surgery, muscles tendon repair     Family History   Problem Relation Age of Onset    Cardiovascular Sister         cervical    Cancer Sister      Social History     Socioeconomic History    Marital status:      Spouse name: Not on file    Number of children: Not on file    Years of education: Not on file    Highest education level: Not on file   Occupational History    Not on file   Tobacco Use    Smoking status: Former    Smokeless tobacco: Never    Tobacco comments:     Quit 44 yrs ago   Substance and Sexual Activity    Alcohol use: Yes     Comment: rarely    Drug use: No    Sexual activity: Not on file   Other Topics Concern    Parent/sibling w/ CABG, MI or angioplasty before 65F 55M? Not Asked   Social History Narrative    Not on file     Social Drivers of Health     Financial Resource Strain: Low Risk  (12/22/2024)    [facility]    Financial Resource Strain     Difficulty of Paying Living Expenses: 3     Difficulty of Paying Living Expenses: Not on file   Food Insecurity: No Food  "Insecurity (12/22/2024)    [facility]    Food Insecurity     Do you worry your food will run out before you are able to buy more?: 1   Transportation Needs: No Transportation Needs (12/22/2024)    Received from [facility]    Transportation Needs     Does lack of transportation keep you from medical appointments?: 1     Does lack of transportation keep you from work, meetings or getting things that you need?: 1   Physical Activity: Not on file   Stress: Not on file   Social Connections: Socially Integrated (12/22/2024)    Received from [facility]    Social Connections     Do you often feel lonely or isolated from those around you?: 0   Interpersonal Safety: Not on file   Housing Stability: Low Risk  (12/22/2024)    Received from [facility]    Housing Stability     What is your housing situation today?: 1     Patient Active Problem List   Diagnosis    S/P lumbar fusion    S/P total hip arthroplasty    Vision loss of right eye     No Known Allergies    Physical Exam   PHYSICAL EXAM  /82 (BP Location: Left arm, Patient Position: Supine, Cuff Size: Adult Regular)   Pulse 84   Temp 97.7  F (36.5  C) (Oral)   Resp 18   Ht 1.753 m (5' 9\")   Wt 86.2 kg (190 lb)   SpO2 94%   BMI 28.06 kg/m    Wt Readings from Last 4 Encounters:   01/10/25 86.2 kg (190 lb)   05/21/18 86.2 kg (190 lb)   02/02/15 86.2 kg (190 lb)   08/30/12 86.6 kg (191 lb)     Physical Exam  Vitals reviewed.   Constitutional:       Appearance: Normal appearance.   HENT:      Head: Normocephalic.      Comments: No temporal tenderness noticed. Good pulses.   Cardiovascular:      Rate and Rhythm: Regular rhythm.      Heart sounds: Normal heart sounds.   Pulmonary:      Breath sounds: Normal breath sounds.   Musculoskeletal:         General: Deformity present. No swelling or tenderness.      Cervical " "back: Neck supple.      Comments: OA changes of hands   Neurological:      Mental Status: He is alert and oriented to person, place, and time.      Motor: No weakness.         Data   CBC:  Recent Labs   Lab Test 01/11/25  0629 01/10/25  1758 05/23/18  0548   WBC 4.2 5.3  --    RBC 3.69* 3.68*  --    HGB 11.7* 11.9* 9.7*   HCT 34.1* 34.1*  --    MCV 92 93  --    MCH 31.7 32.3  --    MCHC 34.3 34.9  --    RDW 12.6 12.6  --     177  --        BMP:  Recent Labs   Lab Test 01/11/25  1414 01/11/25  0934 01/11/25  0629 01/10/25  1758 01/10/25  1758 05/24/18  0602 05/22/18  0607 05/21/18  1308   NA  --   --  139  --  138  --   --   --    POTASSIUM  --   --  4.2  --  4.3  --   --  4.4   CHLORIDE  --   --  100  --  99  --   --   --    CO2  --   --  25  --  26  --   --   --    ANIONGAP  --   --  14  --  13  --   --   --    * 196* 186*   < > 101*  --    < >  --    BUN  --   --  24.8*  --  27.7*  --   --   --    CR  --   --  1.26*  --  1.36* 1.60*  --  1.38*   GFRESTIMATED  --   --  55*  --  50* 42*  --  49*   LISS  --   --  9.8  --  9.9  --   --   --     < > = values in this interval not displayed.       LFT:  No results for input(s): \"PROTTOTAL\", \"ALBUMIN\", \"BILITOTAL\", \"ALKPHOS\", \"AST\", \"ALT\", \"BILIDIRECT\" in the last 12507 hours.    No results found for: \"CKTOTAL\"  No results found for: \"TSH\"    UA RESULTS:  No results for input(s): \"COLOR\", \"APPEARANCE\", \"URINEGLC\", \"URINEBILI\", \"URINEKETONE\", \"SG\", \"UBLD\", \"URINEPH\", \"PROTEIN\", \"UROBILINOGEN\", \"NITRITE\", \"LEUKEST\", \"RBCU\", \"WBCU\" in the last 48448 hours.    Autoimmunity labs:  No results found for: \"RHF\"  No results found for: \"CCPIGG\"  No results found for: \"ANCA\"  No results found for: \"I3HHSTU\"  No results found for: \"F1XIRRF\"  No results found for: \"EDWIN\"  No results found for: \"DNA\"  No results found for: \"RNPIGG\", \"SMIGG\", \"SSAIGG\", \"SSBIGG\", \"SCLIGG\"  No results found for: \"CCPABG\", \"CCPABY\", \"RF8\", \"CARIA\", \"ANCA\", \"HN3179\", \"[id number]\", \"[id number]\", " "\"[id number]\"    IMAGING:  MR Orbit w/o & w Contrast  Narrative: EXAM: MR ORBIT W/O and W CONTRAST  LOCATION: [facility]  DATE: 1/10/2025    INDICATION: R monocular vision loss, request per Ophtho  COMPARISON: None.  CONTRAST: gadobutrol (GADAVIST) injection 9 mL  TECHNIQUE: Multiplanar multisequence dedicated orbit MRI without and with intravenous contrast.    FINDINGS:   RIGHT ORBIT: Normal periorbital soft tissues, bony orbit, globe, extraocular muscles, optic nerve/sheath, periorbital fat and lacrimal gland.     LEFT ORBIT: Normal periorbital soft tissues, bony orbit, globe, extraocular muscles, optic nerve/sheath, periorbital fat and lacrimal gland.    SINUSES: No paranasal sinus mucosal disease.    VISUALIZED INTRACRANIAL CONTENTS: No acute findings. Mild chronic periventricular white matter changes suggestive of microvascular ischemia, with concordant mild to moderate volume loss.   Impression: IMPRESSION:   1.  Normal orbit MRI.  2.  No acute infarct.  3.  Chronic age-related intracranial white matter microvascular ischemic changes with mild/moderate volume loss.        "

## 2025-01-11 NOTE — CONSULTS
OPHTHALMOLOGY CONSULT PROGRESS NOTE  01/11/25    I have not seen or examined the patient, but was available if the need had arisen. 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 ROS of GCA is also negative.     Status post temporal artery biopsy, results pending.     Plan:  IV Solumedrol for 3 days  OK to discharge per rheumatology after biopsy with 60 mg of prednisone  Follow up with neuro-ophthalmology service on 1/23/2024     MRI reviewed, I agree with the radiologist that there are no evidence of enhancement along the visual pathway.    Please direct all questions or concerns to the on-call ophthalmology resident.    Jeff Reis MD   Fellow, Neuro-ophthalmology     ASSESSMENT/PLAN:     Lobito David is a(n) 89 y.o. with a history of stage III CKD, malignant neoplasm of urinary bladder neck, prostate cancer, Hx pulmonary embolism, hypertension, hyperlipidemia. Patient presents from [facility] for evaluation of right optic disc edema.    #Pallid optic disc edema, right eye  Va Hand motion right eye, 20/25 left eye, 3+ APD of the right eye, IOP 14/10, color vision 0/11 right eye. Anterior segment exam of the right eye notable for chronic appearing iris atrophy and dense posterior capsular opacification. Anterior segment exam of the left eye with no acute pathologic findings. Dilated exam of the right eye notable for disc pallor, inferior pallid disc edema with surrounding peripapillary flame hemorrhages. Right macula with blunted foveal light reflex and boggy appearance. Dilated exam of the left eye with pallorous disc appearance but no appreciable disc edema or heme. Broad  differential of unilateral optic disc edema was considered including but not limited to atypical optic neuritis, compressive optic neuropathy, non-arteritic ischemic optic neuropathy, arteritic ischemic optic neuropathy/giant cell arteritis. At this time, given patients age, elevated ESR (72), patchy choroidal filling on fundus fluorescein angiography (can be seen in GCA/AAION) a diagnosis of AAION/GCA must be excluded.    1/11/2025: s/p 1 dose IV solumedrol. Patient with subjective improvement, CF at 1', unable to do color plates. Pupil exam unchanged. Left eye vision 20/20 and color vision full, no issues here. TAB planned for 1/13 as he needs his Xarelto held.    PLAN:  -1g daily of  IV solumedrol 3 - 5 doses  -Appreciate vascular surgery consult  -Rheumatology consult to help with pertinent rheumatologic assessment/workup  -Ophthalmology will follow closely will admitted  -Please contact ophthalmologist on call with any questions or concerns. We appreciate your care of this patient.    Discussed with , neuro-ophthalmology fellow, who agreed with this assessment and plan.     Ortiz Manjarrez MD on 1/11/2025 at 3:29 PM      HISTORY OF PRESENTING ILLNESS:     Lobito David is a 89 year old male who presented on 1/10/25 from [facility] retina appt with concern for GCA/AAION of the right eye. Davon was recently hospitalized on 12/21/24-12/22/24 for extensive stroke workup at [facility]. Patient states he developed sudden painless right eye vision loss while driving on 12/20/24. This prompted ED visit the following day and the aforementioned hospital admission. Stroke work up came back unremarkable and the patient was discharged with close ophthalmology follow up. Was seen at [facility] 1 week ago where it was noted that he had right optic disc edema and macular edema. No treatment was initiated, however patient was given close follow up. He followed up with [facility] today, at which time  fluorescein angiography  testing was performed and demonstrated findings concerning for giant cell arteritis. Patient was told to present to [facility] ED for further evaluation.    Patient denies fevers, chills, weight loss, jaw claudication, arthralgias, scalp tenderness, headaches, pain with eye movements. Left eye is asymptomatic and at it's visual baseline.    10+ review of systems were otherwise negative except for that which has been stated above.      OCULAR/MEDICAL/SURGICAL HISTORIES:     Past Ocular History:   Last eye exam: 1/10/25   Previously diagnosed ocular conditions: right disc edema, choroidal nevus left eye, PED   Prior eye surgery/laser: CE/IOL OU  Contact lens wear: None  Glasses: Yes  Eyedrops: None    Pertinent Systemic Medications:   Current Facility-Administered Medications   Medication Dose Route Frequency Provider Last Rate Last Admin    acetaminophen (TYLENOL) tablet 650 mg  650 mg Oral Q6H PRN Raad Brizuela MD        calcium carbonate (TUMS) chewable tablet 1,000 mg  1,000 mg Oral 4x Daily PRN Racquel Delaney MD        glucose gel 15-30 g  15-30 g Oral Q15 Min PRN Janay Dey MD        Or    dextrose 50 % injection 25-50 mL  25-50 mL Intravenous Q15 Min PRN Janay Dey MD        Or    glucagon injection 1 mg  1 mg Subcutaneous Q15 Min PRN Janay Dey MD        insulin aspart (NovoLOG) injection (RAPID ACTING)  1-3 Units Subcutaneous TID AC Janay Dey MD        insulin aspart (NovoLOG) injection (RAPID ACTING)  1-3 Units Subcutaneous At Bedtime Janay Dey MD        lidocaine (LMX4) cream   Topical Q1H PRN Racquel Delaney MD        lidocaine 1 % 0.1-1 mL  0.1-1 mL Other Q1H PRN Racquel Delaney MD        [Held by provider] lisinopril (ZESTRIL) tablet 20 mg  20 mg Oral Daily Raad Brizuela MD   20 mg at 01/11/25 0759    methylPREDNISolone sodium succinate (solu-MEDROL) 1,000 mg in sodium chloride 0.9 % 283 mL intermittent infusion  1,000 mg Intravenous  Q24H Avery Loja MD   Stopped at 01/11/25 0049    polyethylene glycol (MIRALAX) Packet 17 g  17 g Oral Daily Racquel Delaney MD        [Held by provider] pravastatin (PRAVACHOL) tablet 10 mg  10 mg Oral QPM Raad Brizuela MD        senna-docusate (SENOKOT-S/PERICOLACE) 8.6-50 MG per tablet 1 tablet  1 tablet Oral BID PRN Racquel Delaney MD        Or    senna-docusate (SENOKOT-S/PERICOLACE) 8.6-50 MG per tablet 2 tablet  2 tablet Oral BID PRN Racquel Delaney MD        sodium chloride (PF) 0.9% PF flush 3 mL  3 mL Intracatheter Q8H Racquel Delaney MD   3 mL at 01/11/25 0802    sodium chloride (PF) 0.9% PF flush 3 mL  3 mL Intracatheter q1 min prn Racquel Delaney MD         Current Outpatient Medications   Medication Sig Dispense Refill    allopurinol (ZYLOPRIM) 300 MG tablet Take 1 tablet by mouth daily. Patient unsure of the dosage (100 mg or 300 mg)      aspirin 81 MG EC tablet Take 1 Tablet (81 mg) by mouth once daily with a meal.      bisacodyl (DULCOLAX) 5 MG EC tablet Take 2 Tablets (10 mg) by mouth once daily if needed for Constipation.      hydroCHLOROthiazide 12.5 MG tablet Take 12.5 mg by mouth daily.      polyethylene glycol (MIRALAX) 17 GM/Dose powder Mix 1 scoop (17 g) in liquid then take by mouth once daily.      acetaminophen (TYLENOL) 325 MG tablet Take 2 tablets (650 mg) by mouth every 6 hours as needed for mild pain 40 tablet 0    chlorproMAZINE (THORAZINE) 25 MG tablet Take 1-2 tablets (25-50 mg) by mouth 3 times daily as needed (hiccups) 15 tablet 0    Lisinopril (ZESTRIL PO) Take 20 mg by mouth daily      oxyCODONE IR (ROXICODONE) 5 MG tablet Take 1 tablet (5 mg) by mouth every 3 hours as needed for other (pain control or improvement in physical function. Hold dose for analgesic side effects.) 60 tablet 0    Pravastatin Sodium (PRAVACHOL PO) Take 10 mg by mouth every evening      rivaroxaban ANTICOAGULANT (XARELTO) 10 MG TABS tablet Take 1 tablet (10 mg)  "by mouth daily 30 tablet 0           Past Medical History:  Past Medical History:   Diagnosis Date    AAA (abdominal aortic aneurysm)     Acute diverticulitis     Arthritis     Erectile dysfunction     Hyperlipidemia     Hypertension     Low back pain     Shoulder disorder        Past Surgical History:   Past Surgical History:   Procedure Laterality Date    ARTHROPLASTY HIP Right 5/21/2018    Procedure: ARTHROPLASTY HIP;  Right total hip arthroplasty;  Surgeon: Dale Mcghee MD;  Location: [facility] OR    COLONOSCOPY      OPTICAL TRACKING SYSTEM FUSION POSTERIOR SPINE LUMBAR N/A 2/2/2015    Procedure: OPTICAL TRACKING SYSTEM FUSION SPINE POSTERIOR LUMBAR ONE LEVEL;  Surgeon: Shakir Vasquez MD;  Location:  OR    ORTHOPEDIC SURGERY      femur knee, bilat    ORTHOPEDIC SURGERY      right shoulder arthroscopy    ORTHOPEDIC SURGERY Left     hip surgery, muscles tendon repair       Family History:   No history of macular degeneration or glaucoma    Social History:   No tobacco use    EXAMINATION:     Fundus fluorescein angiography:  Right eye:      Left eye:            Base Eye Exam       Visual Acuity (Snellen - Linear)         Right Left    Near sc CF 1' 20/20              Pupils         Dark Light Shape React APD    Right 4 3 Round Less Brisk Present    Left 4 3 Rond Brisk None              Extraocular Movement         Right Left     Full, Ortho Full, Ortho                  Additional Tests       Color         Right Left    [place]  14/14                    Labs/Studies/Imaging Performed    MRI orbits w/ contrast1/10/2025:  IMPRESSION:   1.  Normal orbit MRI.  2.  No acute infarct.  3.  Chronic age-related intracranial white matter microvascular ischemic changes with mild/moderate volume loss.    MRI Brain w/o contrast (12/22):  \"1. No evidence of acute intracranial abnormality.   2. Mild generalized cerebral volume loss, and mild chronic microangiopathy changes.\"     CTA Head & Neck (12/21):  \"CTA Head:  No intracranial " "proximal large vessel occlusion, flow-limiting luminal stenosis, or cerebral aneurysm.  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 Additionally, there is a left carotid web versus thrombus adherent to the plaque at the level of the distal carotid bulb.\"     Ortiz Manjarrez MD on 1/11/2025 at 3:30 PM           "

## 2025-01-12 ENCOUNTER — ANESTHESIA EVENT (OUTPATIENT)
Dept: SURGERY | Facility: CLINIC | Age: 89
DRG: 516 | End: 2025-01-12
Payer: COMMERCIAL

## 2025-01-12 LAB
ANION GAP SERPL CALCULATED.3IONS-SCNC: 15 MMOL/L (ref 7–15)
BUN SERPL-MCNC: 46.9 MG/DL (ref 8–23)
CALCIUM SERPL-MCNC: 9.6 MG/DL (ref 8.8–10.4)
CHLORIDE SERPL-SCNC: 100 MMOL/L (ref 98–107)
CREAT SERPL-MCNC: 1.63 MG/DL (ref 0.67–1.17)
EGFRCR SERPLBLD CKD-EPI 2021: 41 ML/MIN/1.73M2
ERYTHROCYTE [DISTWIDTH] IN BLOOD BY AUTOMATED COUNT: 12.7 % (ref 10–15)
GLUCOSE BLDC GLUCOMTR-MCNC: 159 MG/DL (ref 70–99)
GLUCOSE BLDC GLUCOMTR-MCNC: 168 MG/DL (ref 70–99)
GLUCOSE BLDC GLUCOMTR-MCNC: 175 MG/DL (ref 70–99)
GLUCOSE BLDC GLUCOMTR-MCNC: 182 MG/DL (ref 70–99)
GLUCOSE BLDC GLUCOMTR-MCNC: 225 MG/DL (ref 70–99)
GLUCOSE BLDC GLUCOMTR-MCNC: 267 MG/DL (ref 70–99)
GLUCOSE SERPL-MCNC: 167 MG/DL (ref 70–99)
HAV IGM SERPL QL IA: NONREACTIVE
HBV CORE IGM SERPL QL IA: NONREACTIVE
HBV SURFACE AG SERPL QL IA: NONREACTIVE
HCO3 SERPL-SCNC: 24 MMOL/L (ref 22–29)
HCT VFR BLD AUTO: 33.3 % (ref 40–53)
HCV AB SERPL QL IA: NONREACTIVE
HGB BLD-MCNC: 11.3 G/DL (ref 13.3–17.7)
MCH RBC QN AUTO: 31.8 PG (ref 26.5–33)
MCHC RBC AUTO-ENTMCNC: 33.9 G/DL (ref 31.5–36.5)
MCV RBC AUTO: 94 FL (ref 78–100)
PLATELET # BLD AUTO: 190 10E3/UL (ref 150–450)
POTASSIUM SERPL-SCNC: 4.5 MMOL/L (ref 3.4–5.3)
RBC # BLD AUTO: 3.55 10E6/UL (ref 4.4–5.9)
SODIUM SERPL-SCNC: 139 MMOL/L (ref 135–145)
WBC # BLD AUTO: 10.9 10E3/UL (ref 4–11)

## 2025-01-12 PROCEDURE — 250N000011 HC RX IP 250 OP 636: Performed by: EMERGENCY MEDICINE

## 2025-01-12 PROCEDURE — 82565 ASSAY OF CREATININE: CPT

## 2025-01-12 PROCEDURE — 85041 AUTOMATED RBC COUNT: CPT

## 2025-01-12 PROCEDURE — 86803 HEPATITIS C AB TEST: CPT

## 2025-01-12 PROCEDURE — 250N000013 HC RX MED GY IP 250 OP 250 PS 637: Performed by: INTERNAL MEDICINE

## 2025-01-12 PROCEDURE — 258N000003 HC RX IP 258 OP 636: Performed by: EMERGENCY MEDICINE

## 2025-01-12 PROCEDURE — 250N000013 HC RX MED GY IP 250 OP 250 PS 637: Performed by: STUDENT IN AN ORGANIZED HEALTH CARE EDUCATION/TRAINING PROGRAM

## 2025-01-12 PROCEDURE — 99233 SBSQ HOSP IP/OBS HIGH 50: CPT | Performed by: INTERNAL MEDICINE

## 2025-01-12 PROCEDURE — 85014 HEMATOCRIT: CPT

## 2025-01-12 PROCEDURE — 36415 COLL VENOUS BLD VENIPUNCTURE: CPT

## 2025-01-12 PROCEDURE — 80048 BASIC METABOLIC PNL TOTAL CA: CPT

## 2025-01-12 PROCEDURE — 86705 HEP B CORE ANTIBODY IGM: CPT

## 2025-01-12 PROCEDURE — 86481 TB AG RESPONSE T-CELL SUSP: CPT

## 2025-01-12 PROCEDURE — 250N000013 HC RX MED GY IP 250 OP 250 PS 637

## 2025-01-12 PROCEDURE — 120N000002 HC R&B MED SURG/OB UMMC

## 2025-01-12 PROCEDURE — 250N000012 HC RX MED GY IP 250 OP 636 PS 637: Performed by: INTERNAL MEDICINE

## 2025-01-12 RX ORDER — FAMOTIDINE 10 MG
10 TABLET ORAL 2 TIMES DAILY
Status: DISCONTINUED | OUTPATIENT
Start: 2025-01-12 | End: 2025-01-13 | Stop reason: HOSPADM

## 2025-01-12 RX ADMIN — Medication 600 MG: at 09:08

## 2025-01-12 RX ADMIN — INSULIN ASPART 1 UNITS: 100 INJECTION, SOLUTION INTRAVENOUS; SUBCUTANEOUS at 16:14

## 2025-01-12 RX ADMIN — Medication 600 MG: at 17:36

## 2025-01-12 RX ADMIN — INSULIN ASPART 1 UNITS: 100 INJECTION, SOLUTION INTRAVENOUS; SUBCUTANEOUS at 09:08

## 2025-01-12 RX ADMIN — FAMOTIDINE 10 MG: 10 TABLET ORAL at 21:27

## 2025-01-12 RX ADMIN — Medication 25 MCG: at 09:08

## 2025-01-12 RX ADMIN — INSULIN ASPART 2 UNITS: 100 INJECTION, SOLUTION INTRAVENOUS; SUBCUTANEOUS at 12:18

## 2025-01-12 RX ADMIN — FAMOTIDINE 10 MG: 10 TABLET ORAL at 12:13

## 2025-01-12 RX ADMIN — SODIUM CHLORIDE 1000 MG: 9 INJECTION, SOLUTION INTRAVENOUS at 21:28

## 2025-01-12 RX ADMIN — POLYETHYLENE GLYCOL 3350 17 G: 17 POWDER, FOR SOLUTION ORAL at 09:09

## 2025-01-12 ASSESSMENT — ACTIVITIES OF DAILY LIVING (ADL)
ADLS_ACUITY_SCORE: 34
ADLS_ACUITY_SCORE: 36
ADLS_ACUITY_SCORE: 59
ADLS_ACUITY_SCORE: 59
ADLS_ACUITY_SCORE: 36
ADLS_ACUITY_SCORE: 59
ADLS_ACUITY_SCORE: 34
ADLS_ACUITY_SCORE: 34
ADLS_ACUITY_SCORE: 59
ADLS_ACUITY_SCORE: 59
ADLS_ACUITY_SCORE: 34
ADLS_ACUITY_SCORE: 36
ADLS_ACUITY_SCORE: 34
ADLS_ACUITY_SCORE: 36
ADLS_ACUITY_SCORE: 59
ADLS_ACUITY_SCORE: 36
ADLS_ACUITY_SCORE: 59

## 2025-01-12 NOTE — PLAN OF CARE
Goal Outcome Evaluation:      Plan of Care Reviewed With: patient    Overall Patient Progress: no changeOverall Patient Progress: no change    Outcome Evaluation: Biopsy tomorrow    Status: Admitted 1/10 for R side vision loss.   Vitals: VSS   Neuros: Aox4. R eye vision darkened with spots, can make out shapes.   IV: PIV SL   Labs: ACHS checks and 0200   Resp: LSC on RA  Diet: Regular   GI/: Voids spont - LBM 1//11 per pt   Skin: Intact ex bruise on abdomen.   Pain: Denies  Activity: Up ad bren  Plan: Temporal artery biopsy Monday; consent done. NPO at midnight. Needs pre op shower tonight.

## 2025-01-12 NOTE — PLAN OF CARE
Status: Admitted 1/10 for R side vision loss.   Vitals: VSS on RA  Neuros: Aox4. R eye vision darkened and spotty, can make out shapes. 5/5 throughout.   IV: PIV SL   Labs: ACHS checks and 0200   Resp: LSC on RA  Diet: Regular   GI/: Voids spont - LBM 1//11 per pt   Skin: Intact ex bruise in abd from previous shot given. 2 RN skin check done by VIRGIE and EV  Pain: Denies  Activity: Up ad bren  Plan: Temporal artery biopsy Monday.       Goal Outcome Evaluation:      Plan of Care Reviewed With: patient    Overall Patient Progress: improvingOverall Patient Progress: improving

## 2025-01-12 NOTE — ANESTHESIA PREPROCEDURE EVALUATION
Anesthesia Pre-Procedure Evaluation    Patient: Lobito David   MRN: [id number] : 1935        Procedure : Procedure(s):  BIOPSY, ARTERY, TEMPORAL          Past Medical History:   Diagnosis Date    AAA (abdominal aortic aneurysm)     Acute diverticulitis     Arthritis     Erectile dysfunction     Hyperlipidemia     Hypertension     Low back pain     Shoulder disorder       Past Surgical History:   Procedure Laterality Date    ARTHROPLASTY HIP Right 2018    Procedure: ARTHROPLASTY HIP;  Right total hip arthroplasty;  Surgeon: Dale Mcghee MD;  Location: [facility] OR    COLONOSCOPY      OPTICAL TRACKING SYSTEM FUSION POSTERIOR SPINE LUMBAR N/A 2015    Procedure: OPTICAL TRACKING SYSTEM FUSION SPINE POSTERIOR LUMBAR ONE LEVEL;  Surgeon: Shakir Vasquez MD;  Location: [facility] OR    ORTHOPEDIC SURGERY      femur knee, bilat    ORTHOPEDIC SURGERY      right shoulder arthroscopy    ORTHOPEDIC SURGERY Left     hip surgery, muscles tendon repair      No Known Allergies   Social History     Tobacco Use    Smoking status: Former    Smokeless tobacco: Never    Tobacco comments:     Quit 44 yrs ago   Substance Use Topics    Alcohol use: Yes     Comment: rarely      Wt Readings from Last 1 Encounters:   25 83.6 kg (184 lb 4.8 oz)        Anesthesia Evaluation   Pt has had prior anesthetic. Type: General.    No history of anesthetic complications       ROS/MED HX  ENT/Pulmonary:  - neg pulmonary ROS     Neurologic: Comment: R eye vision loss c/f GCA    Hx of L5-S1 fusion   (-) no CVA   Cardiovascular: Comment: RBBB    Infrarenal AAA (3cm)    (+) Dyslipidemia hypertension- -   -  - -                                 Previous cardiac testing   Echo: Date: 24 Results:  Final Impressions:    1. Normal LV size, mildly increased wall thickness, normal global systolic function with an estimated EF of 55 - 60%.    2. The aortic valve is calcified, moderate stenosis and mild regurgitation.    3. Mildly enlarged left  atrium.    4. The mitral valve is sclerotic, mild mitral regurgitation.    5. Elevated estimated pulmonary pressures by tricuspid regurgitation velocity and right atrial pressure (36 mmHg plus RAP).     Stress Test:  Date: Results:    ECG Reviewed:  Date: 5/10/18 Results:  NSR  Cath:  Date: Results:      METS/Exercise Tolerance: >4 METS    Hematologic:     (+) History of blood clots (PE 2015),    pt is not anticoagulated,           Musculoskeletal: Comment: S/p B TKA  S/p R LEYDA      GI/Hepatic:     (+) GERD (only with certain foods),                   Renal/Genitourinary:     (+) renal disease (CKD3), type: CRI,            Endo: Comment: Steroid-induced hyperglycemia      Psychiatric/Substance Use:  - neg psychiatric ROS     Infectious Disease:  - neg infectious disease ROS     Malignancy:   (+) Malignancy (bladder and prostate cancer, has since been treated),     Other:  - neg other ROS          Physical Exam    Airway        Mallampati: III   TM distance: > 3 FB   Neck ROM: full   Mouth opening: > 3 cm    Respiratory Devices and Support         Dental       (+) Edentulous      Cardiovascular          Rhythm and rate: regular and normal   (+) murmur       Pulmonary           breath sounds clear to auscultation           OUTSIDE LABS:  CBC:   Lab Results   Component Value Date    WBC 10.9 01/12/2025    WBC 4.2 01/11/2025    HGB 11.3 (L) 01/12/2025    HGB 11.7 (L) 01/11/2025    HCT 33.3 (L) 01/12/2025    HCT 34.1 (L) 01/11/2025     01/12/2025     01/11/2025     BMP:   Lab Results   Component Value Date     01/12/2025     01/11/2025    POTASSIUM 4.5 01/12/2025    POTASSIUM 4.2 01/11/2025    CHLORIDE 100 01/12/2025    CHLORIDE 100 01/11/2025    CO2 24 01/12/2025    CO2 25 01/11/2025    BUN 46.9 (H) 01/12/2025    BUN 24.8 (H) 01/11/2025    CR 1.63 (H) 01/12/2025    CR 1.26 (H) 01/11/2025     (H) 01/12/2025     (H) 01/12/2025     COAGS:   Lab Results   Component Value Date    INR  "1.45 (H) 02/10/2015     POC: No results found for: \"BGM\", \"HCG\", \"HCGS\"  HEPATIC: No results found for: \"ALBUMIN\", \"PROTTOTAL\", \"ALT\", \"AST\", \"GGT\", \"ALKPHOS\", \"BILITOTAL\", \"BILIDIRECT\", \"MALU\"  OTHER:   Lab Results   Component Value Date    A1C 6.1 (H) 01/11/2025    LISS 9.6 01/12/2025    SED 72 (H) 01/10/2025       Anesthesia Plan    ASA Status:  3    NPO Status:  NPO Appropriate    Anesthesia Type: General.     - Airway: ETT   Induction: Intravenous, Propofol.   Maintenance: Balanced.        Consents    Anesthesia Plan(s) and associated risks, benefits, and realistic alternatives discussed. Questions answered and patient/representative(s) expressed understanding.     - Discussed:     - Discussed with:  Patient      - Extended Intubation/Ventilatory Support Discussed: No.      - Patient is DNR/DNI Status: No     Use of blood products discussed: Yes.     - Discussed with: Patient.     - Consented: consented to blood products     Postoperative Care    Pain management: IV analgesics, Oral pain medications, Multi-modal analgesia.   PONV prophylaxis: Ondansetron (or other 5HT-3), Dexamethasone or Solumedrol     Comments:               Moo Gamboa MD    I have reviewed the pertinent notes and labs in the chart from the past 30 days and (re)examined the patient.  Any updates or changes from those notes are reflected in this note.                          # Overweight: Estimated body mass index is 27.22 kg/m  as calculated from the following:    Height as of this encounter: 1.753 m (5' 9\").    Weight as of this encounter: 83.6 kg (184 lb 4.8 oz)., PRESENT ON ADMISSION           "

## 2025-01-12 NOTE — PROGRESS NOTES
[facility]    Progress Note - Medicine Service, MAROON TEAM 4       Date of Admission:  1/10/2025    Assessment & Plan   Lobito David is a 89 year old male with PMH of CKD III, malignant neoplasm bladder neck and prostate, PE, HTN, HLD admitted on 1/10/2025. He presents with 2 weeks of painless acute right eye vision loss with prior imaging negative for stroke. Elevated ESR concerning for inflammatory etiology of vision loss including and not limited to GCA/AAION.    Today  - Cr bump, will post void scan, recheck tomorrow before procedure, fluids if worsening  - NPO at midnight tonight  - Bactrim for PJP ppx- on hold due to creat rise although could be due to bactrim-induced changes in creatinine clearance  - Ca and Vit D supplementation started  - Famotidine 10 mg BID for stress ulcer ppx      Acute right eye vision loss concerning for giant cell arteritis or other inflammatory process  Elevated ESR  Patient with 2 weeks of painless, acute right eye vision loss which is stable with paroxysmal blurriness and periods of improved vision, with blurry right peripheral visual field. Etiology concerning for GCA/AAION versus less likely retinal artery occlusion given normal orbit MRI 1/10/24, with no acute infarct. Symptoms started 12/20. Patient had CT head 12/21 and MRI 12/22 negative for stroke. CTA head and neck 12/22 showing no LVO, AVM or cerebral aneurysm, but found 50% stenosis of left ICA 2/2 predominantly soft atherosclerotic plaque and noting an ulcerative plaque at the level of the carotid bulb. CRP normal, ESR elevated to 72. No leukocytosis. MRI orbits 1/10/25 normal, no acute infarct. Neuro exam normal, except right eye visual deficits. Ophthalmology saw patient in ED and concerned for possible GCA.  - IV methylpred 1g daily 3-5 days per opthalmology, rhuem recommending 3 days and then transitioning to 60 mg pred. Will talk to optho Monday morning to clarify  recs  - Famotidine 20 mg BID for stress ulcer ppx  - Opthomalmology consulted, closely following, appreciate recs  - MRI orbits w and w/o; completed; no acute findings  - steroids as above  - TAB  - Rheum consult, ordered, see below  - EGS will complete TAB on  as of note from . NPO midnight  for procedure.  - Hold any AC for procedure  - Hold statins, NSAIDs pending further GCA workup  - Rheum consult:   - Bactrim for PJP ppx-on hold due to creat rise although could be due to bactrim-induced changes in creatinine clearance   - Vit D and Ca supplementation   - Tb quant gold, hep c and hep b testing before start of tocilizumab   - Rhuem OP follow up 2 weeks after discharge. Clinic will contact patient    Hyperglycemia due to steroids  History of prediabetes  B->139->175->165->168. Likely secondary to high dose steroids. HBG A1c 6.1 . This is in pre-diabetic range. Likely will continue to have elevated BG. Requiring minimal insulin at this time.  - Mealtime and bedtime checks with mealtime and bedtime correction dosing of insulin. Can discuss plan on discharge, maybe metformin or insulin    CKD III, mild GIANCARLO, History of prostate cancer  Bump to 1.63 from 1.26, technically meeting criteria for GIANCARLO. Baseline from [facility] system over last year about 1.3-1.4. Likely prerenal in setting of NPO much of previous two days. Will continue to monitor. Follows with [facility] for history of prostate cancer. Last seen 25 for eligard shot. Recent PSA < 0.04. Recent hematuria, had CT and was okay. Seems like PTA flomax which has not been administered while here. Will hold for procedure tomorrow and then address after the procedure with patient.  - Daily BMP  - Post void bladder scan to check for retention                                                                                            HTN  - PTA lisinopril 20 mg po daily held in preparation of surgery. Will monitor BP and reorder if HTN.     HLD  -  "Hold PTA pravastatin 10 mg po every evening for GCA workup. Hold if workup consistent with GCA.     Chronic Normocytic anemia  Hgb stable, continue to monitor, daily CBC            Diet: Regular Diet Adult  NPO per Anesthesia Guidelines for Procedure/Surgery Except for: Meds    DVT Prophylaxis: Ambulate every shift and VTE Prophylaxis contraindicated due to surgery in less than 48 hours  Palmer Catheter: Not present  Fluids: None  Lines: None     Cardiac Monitoring: None  Code Status: Full Code      Clinically Significant Risk Factors                              # Overweight: Estimated body mass index is 27.22 kg/m  as calculated from the following:    Height as of this encounter: 1.753 m (5' 9\").    Weight as of this encounter: 83.6 kg (184 lb 4.8 oz)., PRESENT ON ADMISSION            Social Drivers of Health   Tobacco Use: Medium Risk (2/9/2024)    Received from [facility]    Patient History     Smoking Tobacco Use: Former     Smokeless Tobacco Use: Never     Passive Exposure: Past         Disposition Plan     Medically Ready for Discharge: Anticipated in 2-4 Days         The patient's care was discussed with the Dr. Dey.    Julian Miles  Medical Student  Medicine Service, [address]  Securely message with [employer] (more info)  Text page via [facility] Paging/Directory   See signed in provider for up to date coverage information      I agree with the content of the medical student's note above including plan of care and examination findings. Edits were made to content of note as necessary.     Rommel Padgett MD  Internal Medicine and Pediatrics, PGY4    ______________________________________________________________________    Interval History   Patient reports similar vision to that of yesterday. Nursing notes reviewed, no acute events noted. Patient doing well this morning. Denies any new symptoms. Looking forward to " procedure and leaving hospital.     Physical Exam   Vital Signs: Temp: 97.7  F (36.5  C) Temp src: Oral BP: 123/69 Pulse: 77   Resp: 18 SpO2: 96 % O2 Device: None (Room air)    Weight: 184 lbs 4.8 oz    General Appearance: Well appearing, non-toxic, grossly atraumatic  Respiratory: non-labored breathing on room air  HEENT:   Similar exam to 1/11:  Patient peripheral field cut off. Reports seeing only fingertips and blurred when peripheral field approached. When asked to count number of fingers, able to appreciate number of fingers correctly towards center of visual field about arms length away, but otherwise unable to report number of fingers, only seeing a blurry hand. Atraumatic appearance. Conjunctiva w/o injection or icterus.  Cardiovascular: normal rhythm, regular rate, no murmurs appreciated  Skin: No rashes grossly appreciated including to face  Neuro: No gross neurologic deficits    Medical Decision Making       Please see A&P for additional details of medical decision making.      Data     I have personally reviewed the following data over the past 24 hrs:    10.9  \   11.3 (L)   / 190     139 100 46.9 (H) /  168 (H)   4.5 24 1.63 (H) \     TSH: N/A T4: N/A A1C: N/A       Imaging results reviewed over the past 24 hrs:   No results found for this or any previous visit (from the past 24 hours).

## 2025-01-13 ENCOUNTER — TELEPHONE (OUTPATIENT)
Dept: OPHTHALMOLOGY | Facility: CLINIC | Age: 89
End: 2025-01-13

## 2025-01-13 ENCOUNTER — ANESTHESIA (OUTPATIENT)
Dept: SURGERY | Facility: CLINIC | Age: 89
DRG: 516 | End: 2025-01-13
Payer: COMMERCIAL

## 2025-01-13 VITALS
RESPIRATION RATE: 18 BRPM | HEART RATE: 80 BPM | OXYGEN SATURATION: 97 % | TEMPERATURE: 98.3 F | HEIGHT: 69 IN | DIASTOLIC BLOOD PRESSURE: 66 MMHG | WEIGHT: 184.3 LBS | SYSTOLIC BLOOD PRESSURE: 125 MMHG | BODY MASS INDEX: 27.3 KG/M2

## 2025-01-13 DIAGNOSIS — H54.61 VISION LOSS OF RIGHT EYE: Primary | ICD-10-CM

## 2025-01-13 LAB
ANION GAP SERPL CALCULATED.3IONS-SCNC: 14 MMOL/L (ref 7–15)
ATRIAL RATE - MUSE: 77 BPM
BUN SERPL-MCNC: 49.3 MG/DL (ref 8–23)
CALCIUM SERPL-MCNC: 9.9 MG/DL (ref 8.8–10.4)
CHLORIDE SERPL-SCNC: 103 MMOL/L (ref 98–107)
CREAT SERPL-MCNC: 1.58 MG/DL (ref 0.67–1.17)
DIASTOLIC BLOOD PRESSURE - MUSE: NORMAL MMHG
EGFRCR SERPLBLD CKD-EPI 2021: 42 ML/MIN/1.73M2
ERYTHROCYTE [DISTWIDTH] IN BLOOD BY AUTOMATED COUNT: 12.9 % (ref 10–15)
GAMMA INTERFERON BACKGROUND BLD IA-ACNC: 0.01 IU/ML
GLUCOSE BLDC GLUCOMTR-MCNC: 166 MG/DL (ref 70–99)
GLUCOSE BLDC GLUCOMTR-MCNC: 176 MG/DL (ref 70–99)
GLUCOSE BLDC GLUCOMTR-MCNC: 201 MG/DL (ref 70–99)
GLUCOSE SERPL-MCNC: 186 MG/DL (ref 70–99)
HCO3 SERPL-SCNC: 22 MMOL/L (ref 22–29)
HCT VFR BLD AUTO: 30 % (ref 40–53)
HGB BLD-MCNC: 10 G/DL (ref 13.3–17.7)
INTERPRETATION ECG - MUSE: NORMAL
M TB IFN-G BLD-IMP: NEGATIVE
M TB IFN-G CD4+ BCKGRND COR BLD-ACNC: 9.99 IU/ML
MCH RBC QN AUTO: 31.5 PG (ref 26.5–33)
MCHC RBC AUTO-ENTMCNC: 33.3 G/DL (ref 31.5–36.5)
MCV RBC AUTO: 95 FL (ref 78–100)
MITOGEN IGNF BCKGRD COR BLD-ACNC: -0.01 IU/ML
MITOGEN IGNF BCKGRD COR BLD-ACNC: 0 IU/ML
P AXIS - MUSE: 63 DEGREES
PLATELET # BLD AUTO: 163 10E3/UL (ref 150–450)
POTASSIUM SERPL-SCNC: 4.2 MMOL/L (ref 3.4–5.3)
PR INTERVAL - MUSE: 198 MS
QRS DURATION - MUSE: 140 MS
QT - MUSE: 422 MS
QTC - MUSE: 471 MS
QUANTIFERON MITOGEN: 10 IU/ML
QUANTIFERON NIL TUBE: 0.01 IU/ML
QUANTIFERON TB1 TUBE: 0 IU/ML
QUANTIFERON TB2 TUBE: 0.01
R AXIS - MUSE: -16 DEGREES
RBC # BLD AUTO: 3.17 10E6/UL (ref 4.4–5.9)
SODIUM SERPL-SCNC: 139 MMOL/L (ref 135–145)
SYSTOLIC BLOOD PRESSURE - MUSE: NORMAL MMHG
T AXIS - MUSE: -14 DEGREES
VENTRICULAR RATE- MUSE: 75 BPM
WBC # BLD AUTO: 8.8 10E3/UL (ref 4–11)

## 2025-01-13 PROCEDURE — 250N000011 HC RX IP 250 OP 636: Performed by: SURGERY

## 2025-01-13 PROCEDURE — 250N000011 HC RX IP 250 OP 636

## 2025-01-13 PROCEDURE — 88313 SPECIAL STAINS GROUP 2: CPT | Mod: TC | Performed by: SURGERY

## 2025-01-13 PROCEDURE — 03BS0ZX EXCISION OF RIGHT TEMPORAL ARTERY, OPEN APPROACH, DIAGNOSTIC: ICD-10-PCS | Performed by: SURGERY

## 2025-01-13 PROCEDURE — 88305 TISSUE EXAM BY PATHOLOGIST: CPT | Mod: 26 | Performed by: OPHTHALMOLOGY

## 2025-01-13 PROCEDURE — 85027 COMPLETE CBC AUTOMATED: CPT

## 2025-01-13 PROCEDURE — 250N000025 HC SEVOFLURANE, PER MIN: Performed by: SURGERY

## 2025-01-13 PROCEDURE — 99239 HOSP IP/OBS DSCHRG MGMT >30: CPT | Mod: GC | Performed by: INTERNAL MEDICINE

## 2025-01-13 PROCEDURE — 36415 COLL VENOUS BLD VENIPUNCTURE: CPT

## 2025-01-13 PROCEDURE — 88342 IMHCHEM/IMCYTCHM 1ST ANTB: CPT | Mod: 26 | Performed by: OPHTHALMOLOGY

## 2025-01-13 PROCEDURE — 250N000012 HC RX MED GY IP 250 OP 636 PS 637

## 2025-01-13 PROCEDURE — 258N000003 HC RX IP 258 OP 636

## 2025-01-13 PROCEDURE — 37609 LIGATION/BX TEMPORAL ARTERY: CPT | Mod: RT | Performed by: SURGERY

## 2025-01-13 PROCEDURE — 999N000054 HC STATISTIC EKG NON-CHARGEABLE

## 2025-01-13 PROCEDURE — 88342 IMHCHEM/IMCYTCHM 1ST ANTB: CPT | Mod: TC | Performed by: SURGERY

## 2025-01-13 PROCEDURE — 999N000141 HC STATISTIC PRE-PROCEDURE NURSING ASSESSMENT: Performed by: SURGERY

## 2025-01-13 PROCEDURE — 250N000013 HC RX MED GY IP 250 OP 250 PS 637: Performed by: ANESTHESIOLOGY

## 2025-01-13 PROCEDURE — 370N000017 HC ANESTHESIA TECHNICAL FEE, PER MIN: Performed by: SURGERY

## 2025-01-13 PROCEDURE — 710N000010 HC RECOVERY PHASE 1, LEVEL 2, PER MIN: Performed by: SURGERY

## 2025-01-13 PROCEDURE — 250N000011 HC RX IP 250 OP 636: Performed by: STUDENT IN AN ORGANIZED HEALTH CARE EDUCATION/TRAINING PROGRAM

## 2025-01-13 PROCEDURE — 88313 SPECIAL STAINS GROUP 2: CPT | Mod: 26 | Performed by: OPHTHALMOLOGY

## 2025-01-13 PROCEDURE — 80048 BASIC METABOLIC PNL TOTAL CA: CPT

## 2025-01-13 PROCEDURE — 84132 ASSAY OF SERUM POTASSIUM: CPT

## 2025-01-13 PROCEDURE — 250N000009 HC RX 250

## 2025-01-13 PROCEDURE — 360N000075 HC SURGERY LEVEL 2, PER MIN: Performed by: SURGERY

## 2025-01-13 PROCEDURE — 272N000001 HC OR GENERAL SUPPLY STERILE: Performed by: SURGERY

## 2025-01-13 RX ORDER — SODIUM CHLORIDE, SODIUM LACTATE, POTASSIUM CHLORIDE, CALCIUM CHLORIDE 600; 310; 30; 20 MG/100ML; MG/100ML; MG/100ML; MG/100ML
INJECTION, SOLUTION INTRAVENOUS CONTINUOUS
Status: DISCONTINUED | OUTPATIENT
Start: 2025-01-13 | End: 2025-01-13 | Stop reason: HOSPADM

## 2025-01-13 RX ORDER — LIDOCAINE 40 MG/G
CREAM TOPICAL
Status: CANCELLED | OUTPATIENT
Start: 2025-01-13

## 2025-01-13 RX ORDER — NALOXONE HYDROCHLORIDE 0.4 MG/ML
0.1 INJECTION, SOLUTION INTRAMUSCULAR; INTRAVENOUS; SUBCUTANEOUS
Status: DISCONTINUED | OUTPATIENT
Start: 2025-01-13 | End: 2025-01-13 | Stop reason: HOSPADM

## 2025-01-13 RX ORDER — LABETALOL HYDROCHLORIDE 5 MG/ML
10 INJECTION, SOLUTION INTRAVENOUS
Status: DISCONTINUED | OUTPATIENT
Start: 2025-01-13 | End: 2025-01-13 | Stop reason: HOSPADM

## 2025-01-13 RX ORDER — PROPOFOL 10 MG/ML
INJECTION, EMULSION INTRAVENOUS PRN
Status: DISCONTINUED | OUTPATIENT
Start: 2025-01-13 | End: 2025-01-13

## 2025-01-13 RX ORDER — SULFAMETHOXAZOLE AND TRIMETHOPRIM 800; 160 MG/1; MG/1
1 TABLET ORAL DAILY
Qty: 30 TABLET | Refills: 0 | Status: ACTIVE | OUTPATIENT
Start: 2025-01-13 | End: 2025-01-13

## 2025-01-13 RX ORDER — LIDOCAINE HYDROCHLORIDE AND EPINEPHRINE 10; 10 MG/ML; UG/ML
INJECTION, SOLUTION INFILTRATION; PERINEURAL PRN
Status: DISCONTINUED | OUTPATIENT
Start: 2025-01-13 | End: 2025-01-13 | Stop reason: HOSPADM

## 2025-01-13 RX ORDER — HYDROMORPHONE HCL IN WATER/PF 6 MG/30 ML
0.2 PATIENT CONTROLLED ANALGESIA SYRINGE INTRAVENOUS EVERY 5 MIN PRN
Status: DISCONTINUED | OUTPATIENT
Start: 2025-01-13 | End: 2025-01-13 | Stop reason: HOSPADM

## 2025-01-13 RX ORDER — CEFAZOLIN SODIUM/WATER 2 G/20 ML
2 SYRINGE (ML) INTRAVENOUS SEE ADMIN INSTRUCTIONS
Status: DISCONTINUED | OUTPATIENT
Start: 2025-01-13 | End: 2025-01-13 | Stop reason: HOSPADM

## 2025-01-13 RX ORDER — MEPERIDINE HYDROCHLORIDE 25 MG/ML
12.5 INJECTION INTRAMUSCULAR; INTRAVENOUS; SUBCUTANEOUS EVERY 5 MIN PRN
Status: DISCONTINUED | OUTPATIENT
Start: 2025-01-13 | End: 2025-01-13 | Stop reason: HOSPADM

## 2025-01-13 RX ORDER — SULFAMETHOXAZOLE AND TRIMETHOPRIM 400; 80 MG/1; MG/1
1 TABLET ORAL 2 TIMES DAILY
Qty: 60 TABLET | Refills: 0 | Status: SHIPPED | OUTPATIENT
Start: 2025-01-13 | End: 2025-01-13

## 2025-01-13 RX ORDER — FENTANYL CITRATE 50 UG/ML
25 INJECTION, SOLUTION INTRAMUSCULAR; INTRAVENOUS EVERY 5 MIN PRN
Status: DISCONTINUED | OUTPATIENT
Start: 2025-01-13 | End: 2025-01-13 | Stop reason: HOSPADM

## 2025-01-13 RX ORDER — HYDROMORPHONE HCL IN WATER/PF 6 MG/30 ML
0.4 PATIENT CONTROLLED ANALGESIA SYRINGE INTRAVENOUS EVERY 5 MIN PRN
Status: DISCONTINUED | OUTPATIENT
Start: 2025-01-13 | End: 2025-01-13 | Stop reason: HOSPADM

## 2025-01-13 RX ORDER — DEXAMETHASONE SODIUM PHOSPHATE 4 MG/ML
INJECTION, SOLUTION INTRA-ARTICULAR; INTRALESIONAL; INTRAMUSCULAR; INTRAVENOUS; SOFT TISSUE PRN
Status: DISCONTINUED | OUTPATIENT
Start: 2025-01-13 | End: 2025-01-13

## 2025-01-13 RX ORDER — ONDANSETRON 2 MG/ML
INJECTION INTRAMUSCULAR; INTRAVENOUS PRN
Status: DISCONTINUED | OUTPATIENT
Start: 2025-01-13 | End: 2025-01-13

## 2025-01-13 RX ORDER — LIDOCAINE 40 MG/G
CREAM TOPICAL
Status: DISCONTINUED | OUTPATIENT
Start: 2025-01-13 | End: 2025-01-13 | Stop reason: HOSPADM

## 2025-01-13 RX ORDER — HYDRALAZINE HYDROCHLORIDE 20 MG/ML
2.5-5 INJECTION INTRAMUSCULAR; INTRAVENOUS EVERY 10 MIN PRN
Status: DISCONTINUED | OUTPATIENT
Start: 2025-01-13 | End: 2025-01-13 | Stop reason: HOSPADM

## 2025-01-13 RX ORDER — DEXAMETHASONE SODIUM PHOSPHATE 4 MG/ML
4 INJECTION, SOLUTION INTRA-ARTICULAR; INTRALESIONAL; INTRAMUSCULAR; INTRAVENOUS; SOFT TISSUE
Status: DISCONTINUED | OUTPATIENT
Start: 2025-01-13 | End: 2025-01-13 | Stop reason: HOSPADM

## 2025-01-13 RX ORDER — CEFAZOLIN SODIUM/WATER 2 G/20 ML
2 SYRINGE (ML) INTRAVENOUS
Status: COMPLETED | OUTPATIENT
Start: 2025-01-13 | End: 2025-01-13

## 2025-01-13 RX ORDER — ONDANSETRON 4 MG/1
4 TABLET, ORALLY DISINTEGRATING ORAL EVERY 30 MIN PRN
Status: DISCONTINUED | OUTPATIENT
Start: 2025-01-13 | End: 2025-01-13 | Stop reason: HOSPADM

## 2025-01-13 RX ORDER — PHENOL 1.4 %
600 AEROSOL, SPRAY (ML) MUCOUS MEMBRANE 2 TIMES DAILY WITH MEALS
Qty: 60 TABLET | Refills: 0 | Status: SHIPPED | OUTPATIENT
Start: 2025-01-13 | End: 2025-02-12

## 2025-01-13 RX ORDER — ACETAMINOPHEN 325 MG/1
975 TABLET ORAL ONCE
Status: COMPLETED | OUTPATIENT
Start: 2025-01-13 | End: 2025-01-13

## 2025-01-13 RX ORDER — PREDNISONE 20 MG/1
60 TABLET ORAL DAILY
Qty: 63 TABLET | Refills: 0 | Status: SHIPPED | OUTPATIENT
Start: 2025-01-13 | End: 2025-02-03

## 2025-01-13 RX ORDER — ONDANSETRON 2 MG/ML
4 INJECTION INTRAMUSCULAR; INTRAVENOUS EVERY 30 MIN PRN
Status: DISCONTINUED | OUTPATIENT
Start: 2025-01-13 | End: 2025-01-13 | Stop reason: HOSPADM

## 2025-01-13 RX ORDER — FAMOTIDINE 10 MG
10 TABLET ORAL 2 TIMES DAILY
Qty: 60 TABLET | Refills: 0 | Status: SHIPPED | OUTPATIENT
Start: 2025-01-13 | End: 2025-02-12

## 2025-01-13 RX ORDER — LIDOCAINE HYDROCHLORIDE 20 MG/ML
INJECTION, SOLUTION INFILTRATION; PERINEURAL PRN
Status: DISCONTINUED | OUTPATIENT
Start: 2025-01-13 | End: 2025-01-13

## 2025-01-13 RX ORDER — FENTANYL CITRATE 50 UG/ML
50 INJECTION, SOLUTION INTRAMUSCULAR; INTRAVENOUS EVERY 5 MIN PRN
Status: DISCONTINUED | OUTPATIENT
Start: 2025-01-13 | End: 2025-01-13 | Stop reason: HOSPADM

## 2025-01-13 RX ORDER — SODIUM CHLORIDE, SODIUM LACTATE, POTASSIUM CHLORIDE, CALCIUM CHLORIDE 600; 310; 30; 20 MG/100ML; MG/100ML; MG/100ML; MG/100ML
INJECTION, SOLUTION INTRAVENOUS CONTINUOUS PRN
Status: DISCONTINUED | OUTPATIENT
Start: 2025-01-13 | End: 2025-01-13

## 2025-01-13 RX ORDER — VITAMIN B COMPLEX
25 TABLET ORAL DAILY
Qty: 30 TABLET | Refills: 1 | Status: SHIPPED | OUTPATIENT
Start: 2025-01-13 | End: 2025-03-14

## 2025-01-13 RX ORDER — SULFAMETHOXAZOLE AND TRIMETHOPRIM 400; 80 MG/1; MG/1
1 TABLET ORAL DAILY
Qty: 60 TABLET | Refills: 0 | Status: ACTIVE | OUTPATIENT
Start: 2025-01-13 | End: 2025-01-16

## 2025-01-13 RX ADMIN — ACETAMINOPHEN 975 MG: 325 TABLET, FILM COATED ORAL at 07:51

## 2025-01-13 RX ADMIN — PHENYLEPHRINE HYDROCHLORIDE 100 MCG: 10 INJECTION INTRAVENOUS at 09:04

## 2025-01-13 RX ADMIN — DEXAMETHASONE SODIUM PHOSPHATE 4 MG: 4 INJECTION, SOLUTION INTRA-ARTICULAR; INTRALESIONAL; INTRAMUSCULAR; INTRAVENOUS; SOFT TISSUE at 09:03

## 2025-01-13 RX ADMIN — INSULIN ASPART 1 UNITS: 100 INJECTION, SOLUTION INTRAVENOUS; SUBCUTANEOUS at 12:58

## 2025-01-13 RX ADMIN — Medication 2 G: at 08:35

## 2025-01-13 RX ADMIN — PROPOFOL 75 MG: 10 INJECTION, EMULSION INTRAVENOUS at 08:43

## 2025-01-13 RX ADMIN — PROPOFOL 25 MG: 10 INJECTION, EMULSION INTRAVENOUS at 08:44

## 2025-01-13 RX ADMIN — PHENYLEPHRINE HYDROCHLORIDE 150 MCG: 10 INJECTION INTRAVENOUS at 09:46

## 2025-01-13 RX ADMIN — PHENYLEPHRINE HYDROCHLORIDE 100 MCG: 10 INJECTION INTRAVENOUS at 09:34

## 2025-01-13 RX ADMIN — PHENYLEPHRINE HYDROCHLORIDE 200 MCG: 10 INJECTION INTRAVENOUS at 09:43

## 2025-01-13 RX ADMIN — PHENYLEPHRINE HYDROCHLORIDE 150 MCG: 10 INJECTION INTRAVENOUS at 09:39

## 2025-01-13 RX ADMIN — LIDOCAINE HYDROCHLORIDE 40 MG: 20 INJECTION, SOLUTION INFILTRATION; PERINEURAL at 09:37

## 2025-01-13 RX ADMIN — SODIUM CHLORIDE, POTASSIUM CHLORIDE, SODIUM LACTATE AND CALCIUM CHLORIDE: 600; 310; 30; 20 INJECTION, SOLUTION INTRAVENOUS at 08:35

## 2025-01-13 RX ADMIN — PHENYLEPHRINE HYDROCHLORIDE 100 MCG: 10 INJECTION INTRAVENOUS at 08:58

## 2025-01-13 RX ADMIN — PREDNISONE 60 MG: 10 TABLET ORAL at 12:44

## 2025-01-13 RX ADMIN — ONDANSETRON 4 MG: 2 INJECTION INTRAMUSCULAR; INTRAVENOUS at 08:42

## 2025-01-13 ASSESSMENT — ACTIVITIES OF DAILY LIVING (ADL)
ADLS_ACUITY_SCORE: 36
ADLS_ACUITY_SCORE: 37
ADLS_ACUITY_SCORE: 36
ADLS_ACUITY_SCORE: 37
ADLS_ACUITY_SCORE: 36
ADLS_ACUITY_SCORE: 37
ADLS_ACUITY_SCORE: 36
ADLS_ACUITY_SCORE: 37

## 2025-01-13 NOTE — ANESTHESIA POSTPROCEDURE EVALUATION
Patient: Lobito David    Procedure: Procedure(s):  BIOPSY, ARTERY, TEMPORAL       Anesthesia Type:  General    Note:  Disposition: Inpatient   Postop Pain Control: Uneventful            Sign Out: Well controlled pain   PONV: No   Neuro/Psych: Uneventful            Sign Out: Acceptable/Baseline neuro status   Airway/Respiratory: Uneventful            Sign Out: Acceptable/Baseline resp. status   CV/Hemodynamics: Uneventful            Sign Out: Acceptable CV status; No obvious hypovolemia; No obvious fluid overload   Other NRE: NONE   DID A NON-ROUTINE EVENT OCCUR? No           Last vitals:  Vitals Value Taken Time   /58 01/13/25 1000   Temp     Pulse 82 01/13/25 1003   Resp 20 01/13/25 1003   SpO2 99 % 01/13/25 1003   Vitals shown include unfiled device data.    Electronically Signed By: Juwan Lopez MD  January 13, 2025  10:04 AM

## 2025-01-13 NOTE — PLAN OF CARE
1130 - 1430  Pt discharged today around 1430. Up independently, VSS, tolerating regular diet. Pt up to bathroom. All discharge paperwork discussed with pt and pt's spouse - aware to  medications at home pharmacy. All questions answered. PIV removed. No concerns.

## 2025-01-13 NOTE — ANESTHESIA CARE TRANSFER NOTE
Patient: Lobito David    Procedure: Procedure(s):  BIOPSY, ARTERY, TEMPORAL       Diagnosis: Vision loss of right eye [H54.61]  Diagnosis Additional Information: No value filed.    Anesthesia Type:   General     Note:    Oropharynx: oropharynx clear of all foreign objects and spontaneously breathing  Level of Consciousness: drowsy  Oxygen Supplementation: face mask  Level of Supplemental Oxygen (L/min / FiO2): 6  Independent Airway: airway patency satisfactory and stable  Dentition: dentition unchanged  Vital Signs Stable: post-procedure vital signs reviewed and stable  Report to RN Given: handoff report given  Patient transferred to: PACU    Handoff Report: Identifed the Patient, Identified the Reponsible Provider, Reviewed the pertinent medical history, Discussed the surgical course, Reviewed Intra-OP anesthesia mangement and issues during anesthesia, Set expectations for post-procedure period and Allowed opportunity for questions and acknowledgement of understanding      Vitals:  Vitals Value Taken Time   /58 01/13/25 1000   Temp     Pulse 83 01/13/25 1001   Resp 19 01/13/25 1001   SpO2 100 % 01/13/25 1001   Vitals shown include unfiled device data.    Electronically Signed By: AILEEN Villa CRNA  January 13, 2025  10:02 AM

## 2025-01-13 NOTE — PLAN OF CARE
Mr. David had his hearing aids today; therefore I could discuss possible Dx of GCA and plan of care with him (discussed prednisone, actemra risks), also per his request, called his wife Jennifer and updated her as well.    Will see him on Thu 1/30 at 4 pm, video visit (he prefers virtual visit as he lives far away). Our staff will reach out to schedule him an appointment with me. He could be discharged on prednisone 60 mg every day after TA biopsy, I will follow result and order actemra as outpatient if biopsy comes back positive for GCA.    Mylene Medrano MD  Rheumatology staff

## 2025-01-13 NOTE — OP NOTE
OPERATIVE REPORT    NAME: Lobito David  MRN: [id number]     DATE OF SURGERY: 1/13/2025       PREOPERATIVE DIAGNOSIS: Vision loss of right eye      POSTOPERATIVE DIAGNOSIS: Same      PROCEDURE: Temporal artery biopsy (right)      SURGEON: Dr. Thanh Neal      ASSISTANT(S):   Bandar Cam MD - Resident - Assisting  Amie Rubalcava MD - Resident - Assisting  Tracie Zarco DO - Resident - Assisting    ANESTHESIA: GETA    EBL: 5 cc    SPECIMEN(S):  ID Type Source Tests Collected by Time Destination   1 : right temporal artery Tissue Artery, Temporal, Right SURGICAL PATHOLOGY EXAM Thanh Neal MD 1/13/2025  9:23 AM         FINDING(S): None    COMPLICATION(S): None    INDICATIONS:   Lobito David is a 89 year old male admitted with acute vision loss of the right eye. A temporal artery biopsy is requested by his primary service. After a discussion of the risks, benefits, and alternatives, the patient elected to undergo surgery.     DESCRIPTION OF PROCEDURE:  After obtaining informed consent, the patient was brought to the operating room and general anesthesia was induced. The patient was positioned supine. The course of the right temporal artery was identified using ultrasound and was marked out. The temporal area was prepped and draped in sterile fashion. SCDs were placed and turned on. A timeout was performed.      A 2 cm incision was made over the course of the temporal artery and dissected down to the artery using blunt and sharp dissection as well as electrocautery. The artery was identified using doppler. The artery was ligated with 2-0 silk suture proximally and distally and was passed off the field. Another vessel was identified in the base of the wound - this had a primarily venous waveform on doppler, but we elected to take a biopsy of this as well, in order to minimized diagnostic uncertainty. After ligating proximal and distal with 2-0 silk ties, a piece of this vessel was also  passed off the field.     The wound was irrigated with saline, local anesthetic ws injected, and hemostasis confirmed.     The wound was closed in layers with 3-0 vicryl for subcutaneous and deep dermal tissues, and 4-0 monocryl for skin. Dermabond was used to dress the incision.     Surgical counts were reported as correct at the conclusion of the case x2. The patient was awakened from anesthesia and there were no immediate complications. Dr. Neal was present for all portions of the operation.    - - - - - - - - - - - - - - - - - -  Bandar Cam MD PGY5  [facility] General Surgery Resident

## 2025-01-13 NOTE — DISCHARGE SUMMARY
"[facility]  Discharge Summary - Medicine & Pediatrics       Date of Admission:  1/10/2025  Date of Discharge:  1/13/2025  Discharging Provider: Dr. Janay Dey  Discharge Service: Medicine Service, PATRICA TEAM 4    Discharge Diagnoses   Acute right eye vision loss concerning for giant cell arteritis or other inflammatory process  Elevated ESR  Hyperglycemia due to steroids  History of prediabetes  CKD III, mild GIANCARLO, History of prostate cancer  HTN  HLD  Chronic Normocytic anemia  Hx of Gout      Clinically Significant Risk Factors     # Overweight: Estimated body mass index is 27.22 kg/m  as calculated from the following:    Height as of this encounter: 1.753 m (5' 9\").    Weight as of this encounter: 83.6 kg (184 lb 4.8 oz).       Follow-ups Needed After Discharge   Follow-up Appointments       Follow Up and recommended labs and tests      Follow up with primary care provider, Mario Alberto Wang, within 7 days to evaluate medication change, to evaluate treatment change, and for hospital follow- up.  The following labs/tests are recommended: BMP.            Patient has follow up scheduled with Dr. Wang on 1/16. Please order recheck BMP.    Unresulted Labs Ordered in the Past 30 Days of this Admission       Date and Time Order Name Status Description    1/13/2025  9:24 AM Surgical Pathology Exam In process         These results will be followed up by Dr. Yao and Dr. Medrano.    Discharge Disposition   Discharged to home  Condition at discharge: Stable    Hospital Course   Lobito David is a 89 year old male with PMH of CKD III, malignant neoplasm bladder neck and prostate, PE, HTN, HLD admitted on 1/10/2025. He presents with 2 weeks of painless acute right eye vision loss with prior imaging negative for stroke. Elevated ESR concerning for inflammatory etiology of vision loss including and not limited to GCA/AAION. TAB completed, results pending, on " steroids.  The following problems were addressed during his hospitalization:    Acute right eye vision loss concerning for giant cell arteritis or other inflammatory process  Elevated ESR  Patient with 2 weeks of painless, acute right eye vision loss which is stable with paroxysmal blurriness and periods of improved vision, with blurry right peripheral visual field. Etiology concerning for GCA/AAION versus less likely retinal artery occlusion given normal orbit MRI 1/10/24, with no acute infarct. Symptoms started 12/20. Patient had CT head 12/21 and MRI 12/22 negative for stroke. CTA head and neck 12/22 showing no LVO, AVM or cerebral aneurysm, but found 50% stenosis of left ICA 2/2 predominantly soft atherosclerotic plaque and noting an ulcerative plaque at the level of the carotid bulb. CRP normal, ESR elevated to 72. No leukocytosis. MRI orbits 1/10/25 normal, no acute infarct. Neuro exam normal, except right eye visual deficits. Ophthalmology, rheumatology saw patient and concerned for possible GCA. Now s/p TAB w/ general surgery. Will be discharged and these results to be followed by Dr. Medrano and Dr. Yao.  - s/p IV methylpred 1g daily 3 days. Discharged on 60 mg of prednisone daily and Dr. Medrano will order Actemra as an outpatient should biopsy be positive. Continue til follow up   - Famotidine 20 mg BID for stress ulcer ppx  - Bactrim for PJP prophylaxis   - Opthomalmology consulted, closely following, appreciate recs  - MRI orbits w and w/o; completed; no acute findings  - steroids as above  - s/p TAB 1/13 w/ EGS, chao for discharge and regular diet per note  - Rheum consult, see below  - Will follow up on 1/23 with Dr. Maximilian Domínguez for statins, restart pravastatin. Was hold for the workup.  - Rheum consult:               - Bactrim for PJP ppx- held for a day 2/2 rising Cr, but after drop, will restart and can follow as OP with BMP at PCP and then further testing afterwards.               -  Vit D and Ca supplementation               - Tb quant gold, hep c and hep b testing negative               - Rhuem OP follow up  on 1/30 with Dr. Medrano.    Hyperglycemia due to steroids  History of prediabetes  BG: in high 100s to low 200s, received 4 units yesterday. Likely secondary to high dose steroids. HBG A1c 6.1 1/11. This is in pre-diabetic range. Likely will continue to have elevated BG. Received mealtime and bedtime checks with mealtime and bedtime correction dosing of insulin.   - Will discharge on prevention dosing metformin of 850 mg once daily.     CKD III, mild GIANCARLO, History of prostate cancer  Cr 1.28->1.63->1.58, technically meeting criteria for GIANCARLO. Baseline from [facility] system over last year about 1.3-1.4. Likely prerenal in setting of NPO much of previous two days. Will continue to monitor. Follows with [facility] for history of prostate cancer. Last seen 1/9/25 for eligard shot. Recent PSA < 0.04. Recent hematuria, had CT and was okay. Patient denies taking flomax. Post void bladder scans show 102 and 57. No evidence of retention.  - Recommend he receive BMP check on follow up with primary care                                                                                     HTN  - Patient not taking lisinopril. Only 12.5 mg hydrochlorothiazide. Will restart on discharge.     HLD  - restart PTA pravastatin     Chronic Normocytic anemia  Hgb stable, continue to monitor    Hx of gout  Allopurinol held. Instructed to hold until seen by PCP with recheck of BMP.      Consultations This Hospital Stay   VASCULAR SURGERY ADULT IP CONSULT  RHEUMATOLOGY IP CONSULT    Code Status   Full Code       The patient was discussed with Dr. Janay Dey    [address] [address]  Phone: [phone]  ______________________________________________________________________    Physical Exam   Vital Signs: Temp:  98.3  F (36.8  C) Temp src: Oral BP: 125/66 Pulse: 80   Resp: 18 SpO2: 97 % O2 Device: Nasal cannula Oxygen Delivery: 2 LPM  Weight: 184 lbs 4.8 oz  General Appearance:     Well appearing, non-toxic, grossly atraumatic  Respiratory: non-labored breathing on room air  CV:  RRR, no murmur   HEENT:   Similar exam to 1/11, 1/12:  Patient peripheral field cut off. Reports seeing only fingertips and blurred when peripheral field approached. When asked to count number of fingers, able to appreciate number of fingers correctly towards center of visual field about arms length away, but otherwise unable to report number of fingers, only seeing a blurry hand. Atraumatic appearance. Conjunctiva w/o injection or icterus.  Skin: No rashes grossly appreciated including to face  Neuro: No gross neurologic deficits      Primary Care Physician   Mario Alberto Wang    Discharge Orders      Adult Eye  Referral      Activity    Your activity upon discharge: activity as tolerated     Follow Up and recommended labs and tests    Follow up with primary care provider, Mario Alberto Wang, within 7 days to evaluate medication change, to evaluate treatment change, and for hospital follow- up.  The following labs/tests are recommended: BMP.     Reason for your hospital stay    You will start the following medications:   Steroids 60 mg once every day  Famotidine is a stomach medicine that decreasing irration and bleeding in the stomach, you will take 10 mg twice a day (once in the morning and once in the evening)   Metformin is a medication that helps control your blood sugars while you are on steroids. You will take this once a day   Bactrim, this medication helps prevent infections while on steroids. Please take this once a day while on steroids.   Start taking calcium twice a day 600 mg twice a day   Take vitamin D once daily    You will need to follow up with the eye doctors and rheumatology doctors. You have a virtual visit with  rheumatology on 1/30. The eye doctor will call you to schedule an appointment to check your vision.     You will follow up with your doctor on Wednesday, please have them check your kidney function with labs.     Diet    Follow this diet upon discharge: Regular diet       Significant Results and Procedures   Most Recent 3 CBC's:  Recent Labs   Lab Test 01/13/25  0648 01/12/25  0634 01/11/25  0629   WBC 8.8 10.9 4.2   HGB 10.0* 11.3* 11.7*   MCV 95 94 92    190 174     Most Recent 3 BMP's:  Recent Labs   Lab Test 01/13/25  1256 01/13/25  1023 01/13/25  0648 01/12/25  0908 01/12/25  0634 01/11/25  0934 01/11/25  0629   NA  --   --  139  --  139  --  139   POTASSIUM  --   --  4.2  --  4.5  --  4.2   CHLORIDE  --   --  103  --  100  --  100   CO2  --   --  22  --  24  --  25   BUN  --   --  49.3*  --  46.9*  --  24.8*   CR  --   --  1.58*  --  1.63*  --  1.26*   ANIONGAP  --   --  14  --  15  --  14   LISS  --   --  9.9  --  9.6  --  9.8   * 201* 186*   < > 167*   < > 186*    < > = values in this interval not displayed.     Most Recent 6 glucoses:  Recent Labs   Lab Test 01/13/25  1256 01/13/25  1023 01/13/25  0648 01/13/25  0530 01/12/25  2136 01/12/25  1949   * 201* 186* 166* 159* 225*     Most Recent ESR & CRP:  Recent Labs   Lab Test 01/10/25  1758   SED 72*   CRPI 3.85       Discharge Medications   Current Discharge Medication List        START taking these medications    Details   calcium carbonate (OS-LISS) 600 MG tablet Take 1 tablet (600 mg) by mouth 2 times daily (with meals).  Qty: 60 tablet, Refills: 0    Associated Diagnoses: Status post total replacement of right hip      famotidine (PEPCID) 10 MG tablet Take 1 tablet (10 mg) by mouth 2 times daily.  Qty: 60 tablet, Refills: 0    Associated Diagnoses: Giant cell arteritis (H)      metFORMIN (GLUCOPHAGE) 850 MG tablet Take 1 tablet (850 mg) by mouth daily (with dinner).  Qty: 30 tablet, Refills: 0    Associated Diagnoses: Prediabetes;  Steroid-induced diabetes mellitus, initial encounter      predniSONE (DELTASONE) 20 MG tablet Take 3 tablets (60 mg) by mouth daily for 21 days.  Qty: 63 tablet, Refills: 0    Associated Diagnoses: Giant cell arteritis (H)      sulfamethoxazole-trimethoprim (BACTRIM DS) 800-160 MG tablet Take 1 tablet by mouth daily.  Qty: 30 tablet, Refills: 0    Associated Diagnoses: Giant cell arteritis (H)      Vitamin D3 (CHOLECALCIFEROL) 25 mcg (1000 units) tablet Take 1 tablet (25 mcg) by mouth daily.  Qty: 30 tablet, Refills: 1    Associated Diagnoses: Giant cell arteritis (H)           CONTINUE these medications which have NOT CHANGED    Details   allopurinol (ZYLOPRIM) 300 MG tablet Take 1 tablet by mouth daily. Patient unsure of the dosage (100 mg or 300 mg)      aspirin 81 MG EC tablet Take 1 Tablet (81 mg) by mouth once daily with a meal.      bisacodyl (DULCOLAX) 5 MG EC tablet Take 2 Tablets (10 mg) by mouth once daily if needed for Constipation.      chlorproMAZINE (THORAZINE) 25 MG tablet Take 1-2 tablets (25-50 mg) by mouth 3 times daily as needed (hiccups)  Qty: 15 tablet, Refills: 0    Associated Diagnoses: Hiccups      hydroCHLOROthiazide 12.5 MG tablet Take 12.5 mg by mouth daily.      Pravastatin Sodium (PRAVACHOL PO) Take 10 mg by mouth every evening           STOP taking these medications       acetaminophen (TYLENOL) 325 MG tablet Comments:   Reason for Stopping:         Lisinopril (ZESTRIL PO) Comments:   Reason for Stopping:         oxyCODONE IR (ROXICODONE) 5 MG tablet Comments:   Reason for Stopping:         polyethylene glycol (MIRALAX) 17 GM/Dose powder Comments:   Reason for Stopping:         rivaroxaban ANTICOAGULANT (XARELTO) 10 MG TABS tablet Comments:   Reason for Stopping:             Allergies   No Known Allergies

## 2025-01-13 NOTE — BRIEF OP NOTE
[facility]    Brief Operative Note    Pre-operative diagnosis: Vision loss of right eye [H54.61]  Post-operative diagnosis Same as pre-operative diagnosis    Procedure: BIOPSY, ARTERY, TEMPORAL, Bilateral - Head    Surgeon: Surgeons and Role:     * Thanh Neal MD - Primary     * Bandar Cam MD - Resident - Assisting     * Amie Rubalcava MD - Resident - Assisting     * Tracie Zarco DO - Resident - Assisting  Anesthesia: Choice   Estimated Blood Loss: 5 ml     Drains: None  Specimens:   ID Type Source Tests Collected by Time Destination   1 : right temporal artery Tissue Artery, Temporal, Right SURGICAL PATHOLOGY EXAM Thanh Neal MD 1/13/2025  9:23 AM      Findings:   None.  Complications: None.  Implants: * No implants in log *      Bandar Cam MD PGY5  [facility] General Surgery Resident

## 2025-01-13 NOTE — ANESTHESIA PROCEDURE NOTES
Airway       Patient location during procedure: OR  Staff -        Anesthesiologist:  Dustin Ramirez MD       Performed By: other anesthesia staff  Consent for Airway        Urgency: elective  Indications and Patient Condition       Indications for airway management: jude-procedural       Induction type:intravenous       Mask difficulty assessment: 0 - not attempted    Final Airway Details       Final airway type: supraglottic airway    Supraglottic Airway Details        Type: LMA       Brand: I-Gel       LMA size: 5    Post intubation assessment        Placement verified by: capnometry        Number of attempts at approach: 1       Number of other approaches attempted: 0       Secured with: tape       Ease of procedure: easy       Dentition: Intact and Unchanged    Additional Comments       Placed by OREN Dodson under attending supervision

## 2025-01-13 NOTE — PROGRESS NOTES
"  Emergency General Surgery Progress Note  Surgery Cross-Cover  Post Op Check    01/13/2025    Lobito David is a 89 year old male POD#0 s/p Procedure(s):  BIOPSY, ARTERY, TEMPORAL for Pre-Op Diagnosis Codes:      * Vision loss of right eye [H54.61]    Pt reports their pain is controlled with current regimen. Denies nausea, SOB, chest pain, or dizziness. Patient Is passing flatus or having bowel movements and Is voiding spontaneously. No concerns at this time. Reports he is hungry.     /66 (BP Location: Left arm)   Pulse 80   Temp 98.3  F (36.8  C) (Oral)   Resp 18   Ht 1.753 m (5' 9\")   Wt 83.6 kg (184 lb 4.8 oz)   SpO2 97%   BMI 27.22 kg/m      Gen: A&O x4, NAD   Chest: breathing non-labored on RA    Incision: clean, dry, intact closed with dermabond  Extremities: warm and well perfused    A/P: No acute post-op issues. Continue plan of care per primary team. Please call with any questions.    Okay for regular diet   Resume xarelto in 72 hours   EGS will sign off at this time, please contact us with any questions    Alba Cast DO, MS   General Surgery, PGY 1      "

## 2025-01-13 NOTE — PLAN OF CARE
"Vital signs:  Temp: 98.1  F (36.7  C) Temp src: Oral BP: 99/65 Pulse: 78   Resp: 12 SpO2: 98 % O2 Device: None (Room air)   Height: 175.3 cm (5' 9\") Weight: 83.6 kg (184 lb 4.8 oz)  Estimated body mass index is 27.22 kg/m  as calculated from the following:    Height as of this encounter: 1.753 m (5' 9\").    Weight as of this encounter: 83.6 kg (184 lb 4.8 oz).  Neuro: A&Ox4, pleasant and cooperative with cares. Patient had a restful night and no acute event overnight.  Cardiac:No tele order, Afebrile, VSS.   Respiratory: Lungs are clear bilat, no cough noted, denies dyspnea, and Sat> 95% RA  GI/: Active bowel sound, passing flatus, and Voiding spontaneously. No BM this shift.  Diet/appetite: Currently on NPO diet. Denies nausea.  Activity: Up independent in the room    Pain: Denies   Skin: No new deficits noted.  Lines: Piv x 1, SL'D  Goal Outcome Evaluation:      Plan of Care Reviewed With: patient    Overall Patient Progress: no changeOverall Patient Progress: no change    Outcome Evaluation: Patient remains alert and oriented x 4, NPO after midnight in preparation for temporal artery biopsy at some point today    Plan: Temporal Artery Biopsy at some point today. Just left to surgery at 0657 with his chart and signed consent. Continue with the current POC, update Team with any change in status      "

## 2025-01-14 ENCOUNTER — PATIENT OUTREACH (OUTPATIENT)
Dept: CARE COORDINATION | Facility: CLINIC | Age: 89
End: 2025-01-14
Payer: COMMERCIAL

## 2025-01-14 ENCOUNTER — TELEPHONE (OUTPATIENT)
Dept: RHEUMATOLOGY | Facility: CLINIC | Age: 89
End: 2025-01-14
Payer: COMMERCIAL

## 2025-01-14 NOTE — TELEPHONE ENCOUNTER
----- Message from Mylene Medrano sent at 1/13/2025  9:22 PM CST -----  Dada Lombardi,    His appointment on 1/30 should be virtual, also I recommend bactrim to be changed to bactrim DS qM/W/F (three times a week) for PJP prophylaxis. He has low GFR and they discharged him on daily dose.    Carine, I placed MTM referral. As soon as we get biopsy result back, if it confirms GCA Dx, need your help with steroid taper/actemra qwk inj order/approval.

## 2025-01-14 NOTE — TELEPHONE ENCOUNTER
Called to relay recommendations from Dr. Medrano. Voicemail does not identify patient, left generic message with call back number. No consent to communicate on file to speak with any other family members.     4:34 PM: second call today placed. No answer. VM left earlier.       Maria C Gallo RN  Adult Rheumatology Clinic

## 2025-01-14 NOTE — PROGRESS NOTES
[facility]    Background: Transitional Care Management program identified per system criteria and reviewed by [facility] team for possible outreach.    Assessment: Upon chart review, [facility] Team member will not proceed with patient outreach related to this episode of Transitional Care Management program due to reason below:    Patient declined to answer all post hospital discharge questions with CCRC team member and disconnected call.    Plan: Transitional Care Management episode addressed appropriately per reason noted above.      ARCELIA Arnett  [facility] Resource [facility]    *Connected Care Resource Team does NOT follow patient ongoing. Referrals are identified based on internal discharge reports and the outreach is to ensure patient has an understanding of their discharge instructions.

## 2025-01-15 ENCOUNTER — PATIENT OUTREACH (OUTPATIENT)
Dept: SURGERY | Facility: CLINIC | Age: 89
End: 2025-01-15
Payer: COMMERCIAL

## 2025-01-15 NOTE — PROGRESS NOTES
01/15/25    10:03 AM     Lobito David is a patient of Dr. Thanh Neal that underwent  temporal artery biopsy  approximately 2 days ago (1/13).  Attempted to contact patient via telephone for a status update and review post-op  teaching.  LM on VM to call office.  Await return call.      Of note:  Pathology:  Reviewed with patient by Dr. Medrano (Rheumatology)  Final Diagnosis   Temporal artery, right, biopsy: Loss of the internal elastic lamina with scattered CD68-positive histiocytes suspicious for giant cell arteritis.     Wound:  Skin Sealant  Follow-up:  Routine, has follow-up scheduled with Ophthalmology and Rheumatology  Restrictions:  - No activity restrictions  New medications:  see discharge summary  Equipment/Supplies:  None  Diet:  Regular

## 2025-01-15 NOTE — TELEPHONE ENCOUNTER
Called and spoke with Lobito. He requested I speak with his wife Jennifer. I relayed recommendations from Dr. Medrano regarding Bactrim. Discussed change from in person to video visit. Patient and wife to call us if they having issues logging into [employer].       Maria C Gallo RN  Adult Rheumatology Clinic

## 2025-01-16 ENCOUNTER — TELEPHONE (OUTPATIENT)
Dept: RHEUMATOLOGY | Facility: CLINIC | Age: 89
End: 2025-01-16
Payer: COMMERCIAL

## 2025-01-16 ENCOUNTER — VIRTUAL VISIT (OUTPATIENT)
Dept: PHARMACY | Facility: CLINIC | Age: 89
End: 2025-01-16
Attending: INTERNAL MEDICINE
Payer: COMMERCIAL

## 2025-01-16 DIAGNOSIS — E78.5 HYPERLIPIDEMIA LDL GOAL <100: ICD-10-CM

## 2025-01-16 DIAGNOSIS — M31.6 GIANT CELL ARTERITIS (H): Primary | ICD-10-CM

## 2025-01-16 DIAGNOSIS — M31.6 GIANT CELL ARTERITIS (H): ICD-10-CM

## 2025-01-16 DIAGNOSIS — M31.6 GCA (GIANT CELL ARTERITIS) (H): Primary | ICD-10-CM

## 2025-01-16 DIAGNOSIS — I10 BENIGN ESSENTIAL HYPERTENSION: ICD-10-CM

## 2025-01-16 LAB
PATH REPORT.COMMENTS IMP SPEC: NORMAL
PATH REPORT.COMMENTS IMP SPEC: NORMAL
PATH REPORT.FINAL DX SPEC: NORMAL
PATH REPORT.GROSS SPEC: NORMAL
PATH REPORT.MICROSCOPIC SPEC OTHER STN: NORMAL
PATH REPORT.RELEVANT HX SPEC: NORMAL
PHOTO IMAGE: NORMAL

## 2025-01-16 RX ORDER — TOCILIZUMAB 180 MG/ML
162 INJECTION, SOLUTION SUBCUTANEOUS WEEKLY
Qty: 3.6 ML | Refills: 11 | OUTPATIENT
Start: 2025-01-16 | End: 2025-01-16

## 2025-01-16 RX ORDER — SULFAMETHOXAZOLE AND TRIMETHOPRIM 400; 80 MG/1; MG/1
TABLET ORAL
Qty: 36 TABLET | Refills: 3 | Status: SHIPPED | OUTPATIENT
Start: 2025-01-16

## 2025-01-16 RX ORDER — SULFAMETHOXAZOLE AND TRIMETHOPRIM 400; 80 MG/1; MG/1
TABLET ORAL
Status: SHIPPED
Start: 2025-01-16 | End: 2025-01-16

## 2025-01-16 RX ORDER — TOCILIZUMAB-AAZG 162 MG/.9ML
162 INJECTION, SOLUTION SUBCUTANEOUS WEEKLY
Qty: 3.6 ML | Refills: 5 | OUTPATIENT
Start: 2025-01-16

## 2025-01-16 NOTE — TELEPHONE ENCOUNTER
PA Initiation    Medication: ACTEMRA ACTPEN 162 MG/0.9ML SC SOAJ  Insurance Company:    Pharmacy Filling the Rx:    Filling Pharmacy Phone:    Filling Pharmacy Fax:    Start Date: 1/16/2025     [id number])

## 2025-01-16 NOTE — PATIENT INSTRUCTIONS
"Recommendations from today's MTM visit:                                                       1. We are working on obtaining insurance coverage for Actemra. Once this is approved the pharmacy will call you to set up delivery so you can start 162 mg (1 injection) weekly. We discussed your insurance may require the biosimilar Tyenne instead.    2. A common side effect of Actemra is injection site reactions (red, raised, itchy spot at injection site). You can use hydrocortisone cream and ice to treat these reactions if they occur.     3. Once you start Actemra start the following prednisone taper:  50 mg daily x 1 week  40 mg daily x 1 week  35 mg daily x 1 week  30 mg daily x 1 week  25 mg daily x 1 week  20 mg daily x 1 week  15 mg daily x 1 week  12.5 mg daily x 2 weeks  10 mg daily x 1 week    4. I will fax over the needed labs to [facility] lab. Please complete these after your primary care visit today.    5. Discuss increased blood sugars and monitoring your blood pressure at home with Dr. Wang at your visit this afternoon.    Follow-up: Return in 4 weeks (on 2/13/2025) for MTM Pharmacist Visit.    It was great speaking with you today.  I value your experience and would be very thankful for your time in providing feedback in our clinic survey. In the next few days, you may receive an email or text message from [employer] with a link to a survey related to your  clinical pharmacist.\"     To schedule another MTM appointment, please call the clinic directly or you may call the MTM scheduling line at [phone].    My Clinical Pharmacist's contact information:                                                      Please feel free to contact me with any questions or concerns you have.      Carine Moseley, PharmD  Medication Therapy Management Pharmacist  [facility]  Phone: [phone]     "

## 2025-01-16 NOTE — PROGRESS NOTES
01/16/25    10:52 AM     Attempted to contact patient x 2 for post-op telephone call/status update.  LM on VM to call office-contact information provided.

## 2025-01-16 NOTE — PROGRESS NOTES
I just called and updated wife Jennifer about plan of care. TA biopsy confirmed Dx of GCA. If insurance covers, they prefer actemra sub q weekly inj over monthly infusions. I will go ahead and send the Rx to [facility]. As soon as he starts the actemra, could start tapering prednisone down, I would use UPTODATE fast taper table. Recommend repeating CBC diff, AST/ALT, Cr, ESR sometime this week, then monitoring labs 4 weeks after start of actemra. They got Maria C's msg and went down on bactrim to 1 tab qM/W/F, this is due to low GFR, indication is PJP prophylaxis. They asked about vision recovery, I deferred that to Dr. Yao to address, but I also explained that sometimes vision loss is not reversible if the sx start longtime before Tx. They are going to keep 1/23 appointment with eye clinic, 1/30 video visit with me.    MTM visit with Carine gonzales.

## 2025-01-16 NOTE — PROGRESS NOTES
Medication Therapy Management (MTM) Encounter    ASSESSMENT:                            Medication Adherence/Access: No issues identified.    Giant cell arteritis (GCA): Provided education on Actemra today including dosing, general administration, side effects (both common/serious), precautions, monitoring and time to efficacy. Discussed data on malignancy and risk of serious infection in depth. Encouraged indicated non-live vaccines and avoidance of live vaccines. Reviewed to abstain from vaccination until prednisone dose is <20 mg daily. Discussed potential need to hold therapy in the setting of signs/symptoms of active infection. Encouraged him to contact the rheumatology clinic in the event he has questions on this. Would benefit from starting Actemra 162 mg injection weekly once it arrives from the pharmacy. Discussed prednisone taper after starting Actemra in depth. Reviewed directions and reasoning for all other prescribed medications today.    Hypertension: Recommend monitoring BP at home due to high dose steroids. Recommend discussing with PCP at upcoming visit today.    Hyperlipidemia: Stable. Encouraged close follow up with PCP as Actemra can increase cholesterol levels.    PLAN:                            1. We are working on obtaining insurance coverage for Actemra. Once this is approved the pharmacy will call you to set up delivery so you can start 162 mg (1 injection) weekly. We discussed your insurance may require the biosimilar Tyenne instead.    -- Insurance requires use of Tyenne biosimilar for coverage. New orders sent per [facility] CPA.    2. A common side effect of Actemra is injection site reactions (red, raised, itchy spot at injection site). You can use hydrocortisone cream and ice to treat these reactions if they occur.     3. Once you start Actemra start the following prednisone taper:  50 mg daily x 1 week  40 mg daily x 1 week  35 mg daily x 1 week  30 mg daily x 1 week  25 mg daily x 1  week  20 mg daily x 1 week  15 mg daily x 1 week  12.5 mg daily x 2 weeks  10 mg daily x 1 week    4. I will fax over the needed labs to [facility] lab. Please complete these after your primary care visit today.    5. Discuss increased blood sugars and monitoring your blood pressure at home with Dr. Wang at your visit this afternoon.    Follow-up: Return in 4 weeks (on 2/13/2025) for MTM Pharmacist Visit.    SUBJECTIVE/OBJECTIVE:                          Lobito David is a 89 year old male seen for an initial visit. He was referred to me from Mylene Medrano MD. Patient was accompanied by his spouse Jennifer.     Reason for visit: Actemra education    Allergies/ADRs: Reviewed in chart  Past Medical History: Reviewed in chart  Tobacco: He reports that he has quit smoking. He has never used smokeless tobacco.  Alcohol: Less than 1 beverage / month - rarely drinks alcohol    Medication Adherence/Access: no issues reported.    Giant cell arteritis (GCA):  Prednisone 60 mg daily  Famotidine 10 mg twice daily  Bactrim 400/80 mg three times weekly (M,W,F)   Metformin 850 mg daily  Calcium 600 mg twice daily   Vitamin D 1000 units twice daily     Patient reports he is doing alright since being home from the hospital. Had a couple nights of insomnia but now sleeping through the night. Is craving sugary drinks and has increased urination. Being treated for prostate cancer with Eligard injection every 3 months. Notes he gets night sweats with this but usually resolves a few days after injection - started having night sweats again earlier this week. Switched to three times weekly Bactrim - no questions or concerns on this. Taking all other medications as prescribed. Prefers self-injections over infusions. Would like to do labs at [facility] if able.    1/16/25 plan per Dr. Medrano: Start Actemra 162 mg weekly. Upon starting actemra, could start tapering prednisone down, recommend UPTODATE fast  taper table. Recommend repeating CBC diff, AST/ALT, Cr, ESR sometime this week, then monitoring labs 4 weeks after start of actemra.     Stone 2017 Accelerated prednisone taper:  60 mg daily x 1 week  50 mg daily x 1 week  40 mg daily x 1 week  35 mg daily x 1 week  30 mg daily x 1 week  25 mg daily x 1 week  20 mg daily x 1 week  15 mg daily x 1 week  12.5 mg daily x 2 weeks  10 mg daily x 1 week  9 mg daily x 1 week  8 mg daily x 1 week  7 mg daily x 1 week  6 mg daily x 2 weeks  5 mg daily x 2 weeks  4 mg daily x 2 weeks  3 mg daily x 2 weeks  2 mg daily x 2 weeks  1 mg daily x 2 weeks    Pre-Biologic Screening:   Hep B Surface Antibody No record of completion   Hep B Core Antibody  Non-reactive (1/12/25)    Hep B Surface Antigen Non-reactive (1/12/25)    Hep C Antibody  Non-reactive (1/12/25)    HIV Antigen Antibody No record of completion    Quantiferon TB Gold Negative (1/12/25)      CBC RESULTS:   Recent Labs   Lab Test 01/13/25  0648   WBC 8.8   RBC 3.17*   HGB 10.0*   HCT 30.0*   MCV 95   MCH 31.5   MCHC 33.3   RDW 12.9         Hypertension:  Hydrochlorothiazide 12.5 mg daily  Aspirin 81 mg daily    Does not check BP at home. No side effects or concerns noted.    Hyperlipidemia:  Pravastatin 10 mg daily     No side effects or concerns noted.    Today's Vitals: There were no vitals taken for this visit.  ----------------  Post Discharge Medication Reconciliation Status: discharge medications reconciled, continue medications without change.    I spent 60 minutes with this patient today. All changes were made via collaborative practice agreement with Mylene Medrano.     A summary of these recommendations was sent via [employer].    Carine Moseley, PharmD  Medication Therapy Management Pharmacist  [facility]  Phone: [phone]    Telemedicine Visit Details  The patient's medications can be safely assessed via a telemedicine encounter.  Type of service:  Telephone  visit  Originating Location (pt. Location): Home    Distant Location (provider location):  Off-site  Start Time: 10:00 AM  End Time: 11:00 AM     Medication Therapy Recommendations  Giant cell arteritis (H)   1 Current Medication: Tocilizumab-aazg (TYENNE) 162 MG/0.9ML SOAJ   Current Medication Sig: Inject 162 mg subcutaneously once a week.   Rationale: Does not understand instructions - Adherence - Adherence   Recommendation: Provide Education   Status: Patient Agreed - Adherence/Education   Identified Date: 1/16/2025 Completed Date: 1/16/2025         2 Current Medication: predniSONE (DELTASONE) 20 MG tablet   Current Medication Sig: Take 3 tablets (60 mg) by mouth daily for 21 days.   Rationale: Does not understand instructions - Adherence - Adherence   Recommendation: Provide Education - Taper per Stone 2017 once Actemra started   Status: Patient Agreed - Adherence/Education   Identified Date: 1/16/2025 Completed Date: 1/16/2025

## 2025-01-20 ENCOUNTER — TELEPHONE (OUTPATIENT)
Dept: RHEUMATOLOGY | Facility: CLINIC | Age: 89
End: 2025-01-20
Payer: COMMERCIAL

## 2025-01-20 NOTE — TELEPHONE ENCOUNTER
Called patient to inform of need for labs for Dr. Medrano. Labs have been faxed to [facility] and will get them done tomorrow.       Maria C Gallo RN  Adult Rheumatology Clinic

## 2025-01-22 NOTE — PROGRESS NOTES
1. Arteritic anterior ischemic optic neuropathy, right eye, with severe vision loss.   2. Biopsy proven giant cell arteritis     Discussed with patient today that unfortunately it his right eye vision loss is permanent. Fortunately he does not have vision loss in the left eye.  Discussed that he will require treatment with high dose corticosteroids and that these will be tapered as Actemra is initiated.     3. Posterior capsular opacity in the right eye- dense- may be worthwhile undergoing posterior capsular opacity in the future although his ischemic optic neuropathy is certainly the primary etiology for his vision loss in the right eye.    Plan:  Rheumatology appointment 1/30/2025 with Dr. Medrano.   Continue prednisone at current dose of 60 mg daily. Recommend start Actemra if patient deemed to be a candidate.  Patient on GI prophylaxis and PJP prophylaxis and will continue    Follow-up with me in 2 months    Lobito David is a pleasant 89 year old White male who presents to my neuro-ophthalmology clinic today having been referred by Dr. 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     HPI:    Patient has a known history of stage III CKD, malignant neoplasm of urinary bladder neck, prostate cancer, Hx pulmonary embolism, hypertension, hyperlipidemia. He was first seen by the [facility] ophthalmology department in the ED after presenting from [facility] for evaluation of right optic disc edema.     Patient was recently hospitalized on 12/21/24-12/22/24 for extensive stroke workup at [facility]. States he developed sudden painless right eye vision loss while driving on 12/20/24. This prompted ED visit the following day and the aforementioned hospital admission. Stroke work up came back unremarkable and the patient was discharged with close ophthalmology follow up. Was seen at [facility] where it was noted that he had right optic  disc edema and macular edema. No treatment was initiated, however patient was given close follow up. He followed up with M on 1/10/25, at which time fluorescein angiography testing was performed and demonstrated choroidal filling defects concerning for giant cell arteritis. Patient was told to present to [facility] ED for further evaluation.    He was subsequently admitted to the hospital on 1/10/25 for workup and steroids. He was started on 1g daily of IV solumedrol and underwent lab workup and a temporal artery biopsy. ESR was 72 and normal CRP and platelets. His TAB was performed 1/14/25 and pathology results were concerning for Giant cell arteritis.  Patient reported some subjective vision improvement following IV solumedrol. However bedside exam revealed Count fingers  vision at 1 foot in the right eye. He was discharged from the hospital on 1/13/25. He has since been taking prednisone oral 60 mg daily.     Today, patient reports some improvement in the right eye. He notes that some mornings he has floaters in the vision of right eye and reports occasional flashes. He denies any current unexplained fatigue, fevers, weight loss, joint pain or stiffness, new headaches, scalp tenderness, jaw claudication (tiredness with talking or chewing), or double vision.  He reports that around 4 weeks, ago, he did have a few episodes of horizontal double vision lasting around 1 minute.    Patient is scheduled for rheumatology appointment on 1/30/2025    Independent historians:  Patient and wife     Review of outside testing:    Temporal artery biopsy, right side (1/13/25):  Final Diagnosis   Temporal artery, right, biopsy: Loss of the internal elastic lamina with scattered CD68-positive histiocytes suspicious for giant cell arteritis.       Erythrocyte Sedimentation Rate   Date Value Ref Range Status   01/10/2025 72 (H) 0 - 20 mm/hr Final     CRP Inflammation   Date Value Ref Range Status   01/10/2025 3.85 <5.00 mg/L  Final     MRI orbits w/w/o contrast 1/10/24  IMPRESSION:   1.  Normal orbit MRI.  2.  No acute infarct.  3.  Chronic age-related intracranial white matter microvascular ischemic changes with mild/moderate volume loss.    MRI brain w/o contrast 12/22/24:   Impression:   1. No evidence of acute intracranial abnormality.   2. Mild generalized cerebral volume loss, and mild chronic microangiopathy changes.     CTA head 12/22/24:   IMPRESSION:   No intracranial proximal large vessel occlusion, flow-limiting luminal   stenosis, or cerebral aneurysm.     Review of outside clinical notes:    Ophthalmology progress note from Hospitalization starting 1/10/25:   Anterior ischemic optic neuropathy, RIGHT eye,  highly likely to be related to giant cell arteritis ( FA revealed choroidal filling defects from outside provider)     ESR 72 but CRP 3.85, Normal PLT level. ROS of GCA is also negative.      Status post temporal artery biopsy, results pending.      Plan:  IV Solumedrol for 3 days  OK to discharge per rheumatology after biopsy with 60 mg of prednisone  Follow up with neuro-ophthalmology service on 1/23/2024      MRI reviewed, I agree with the radiologist that there are no evidence of enhancement along the visual pathway.     Please direct all questions or concerns to the on-call ophthalmology resident.     Jeff Reis MD   Fellow, Neuro-ophthalmology      ASSESSMENT/PLAN:     Lobito David is a(n) 89 y.o. with a history of stage III CKD, malignant neoplasm of urinary bladder neck, prostate cancer, Hx pulmonary embolism, hypertension, hyperlipidemia. Patient presents from [facility] for evaluation of right optic disc edema.     #Pallid optic disc edema, right eye  Va Hand motion right eye, 20/25 left eye, 3+ APD of the right eye, IOP 14/10, color vision 0/11 right eye. Anterior segment exam of the right eye notable for chronic appearing iris atrophy and dense posterior capsular  opacification. Anterior segment exam of the left eye with no acute pathologic findings. Dilated exam of the right eye notable for disc pallor, inferior pallid disc edema with surrounding peripapillary flame hemorrhages. Right macula with blunted foveal light reflex and boggy appearance. Dilated exam of the left eye with pallorous disc appearance but no appreciable disc edema or heme. Broad differential of unilateral optic disc edema was considered including but not limited to atypical optic neuritis, compressive optic neuropathy, non-arteritic ischemic optic neuropathy, arteritic ischemic optic neuropathy/giant cell arteritis. At this time, given patients age, elevated ESR (72), patchy choroidal filling on fundus fluorescein angiography (can be seen in GCA/AAION) a diagnosis of AAION/GCA must be excluded.     1/11/2025: s/p 1 dose IV solumedrol. Patient with subjective improvement, CF at 1', unable to do color plates. Pupil exam unchanged. Left eye vision 20/20 and color vision full, no issues here. TAB planned for 1/13 as he needs his Xarelto held.     PLAN:  -1g daily of  IV solumedrol 3 - 5 doses  -Appreciate vascular surgery consult  -Rheumatology consult to help with pertinent rheumatologic assessment/workup  -Ophthalmology will follow closely will admitted  -Please contact ophthalmologist on call with any questions or concerns. We appreciate your care of this patient.     Discussed with , neuro-ophthalmology fellow, who agreed with this assessment and plan.      Ortiz Manjarrez MD on 1/11/2025 at 3:29 PM     Past medical history:    Patient Active Problem List   Diagnosis    S/P lumbar fusion    S/P total hip arthroplasty    Vision loss of right eye   History of bladder cancer diagnosed around 2022 and surgically excised. No prior chemo or radiation.      Patient has a current medication list which includes the following prescription(s): aspirin, bisacodyl, calcium carbonate, chlorpromazine, famotidine,  hydrochlorothiazide, metformin, pravastatin sodium, prednisone, sulfamethoxazole-trimethoprim, tamsulosin, tyenne, and vitamin d3.. List is confirmed today.    Family history / social history:  Patient's family history includes Cancer in his sister; Cardiovascular in his sister.     Patient  reports that he has quit smoking. He has never used smokeless tobacco. He reports current alcohol use. He reports that he does not use drugs.     Exam:  Visual acuity is 2/200 eccentric right eye and 20/20 -2 left eye.  Color vision decreased to 1 out of 11 right eye, normal left eye.  Pupil exam reveals APD of the right eye. Dilated fundus exam shows moderate pallor superiorly of right optic nerve head drusen with resolving peripapillary hemorrhage superiorly right eye. Left optic nerve head is normal appearing without pallor or edema or hemorrhage. Please see epic chart for complete exam    Tests ordered and interpreted today:    Zeiss 24-2 visual field  In the right eye: low vision protocol: dense nasal defects splitting fixation  In the left eye: reliable and full    OCT of the optic nerve head revealed   In the right eye: thickening inferiorly of retinal nerve fiber layer compared to age-matched controls   In the left eye: normal retinal nerve fiber layer compared to age-matched controls          Ortiz Trujillo MD  Resident Physician, PGY-3  Department of Ophthalmology     45 minutes were spent on the date of the encounter by me doing chart review, history and exam, documentation, and further activities as noted above    Complete documentation of historical and exam elements from today's encounter can be found in the full encounter summary report (not reduplicated in this progress note).  I personally obtained the chief complaint(s) and history of present illness.  I confirmed and edited as necessary the review of systems, past medical/surgical history, family history, social history, and examination findings as documented by  others; and I examined the patient myself.  I personally reviewed the relevant tests, images, and reports as documented above.  I formulated and edited as necessary the assessment and plan and discussed the findings and management plan with the patient and family.  I personally reviewed the ophthalmic test(s) associated with this encounter, agree with the interpretation(s) as documented by the resident/fellow, and have edited the corresponding report(s) as necessary.     Chandrakant Yao MD

## 2025-01-23 ENCOUNTER — OFFICE VISIT (OUTPATIENT)
Dept: OPHTHALMOLOGY | Facility: CLINIC | Age: 89
End: 2025-01-23
Attending: OPHTHALMOLOGY
Payer: COMMERCIAL

## 2025-01-23 DIAGNOSIS — H53.10 SUBJECTIVE VISUAL DISTURBANCE: Primary | ICD-10-CM

## 2025-01-23 DIAGNOSIS — H47.011 ISCHEMIC OPTIC NEUROPATHY OF RIGHT EYE: ICD-10-CM

## 2025-01-23 DIAGNOSIS — M31.6 GIANT CELL ARTERITIS (H): ICD-10-CM

## 2025-01-23 DIAGNOSIS — H53.40 VISUAL FIELD DEFECT: ICD-10-CM

## 2025-01-23 PROCEDURE — 92083 EXTENDED VISUAL FIELD XM: CPT | Performed by: OPHTHALMOLOGY

## 2025-01-23 PROCEDURE — 92133 CPTRZD OPH DX IMG PST SGM ON: CPT | Performed by: OPHTHALMOLOGY

## 2025-01-23 RX ORDER — TAMSULOSIN HYDROCHLORIDE 0.4 MG/1
0.4 CAPSULE ORAL DAILY
COMMUNITY

## 2025-01-23 ASSESSMENT — REFRACTION_WEARINGRX
OD_SPHERE: +1.75
SPECS_TYPE: READING
OS_AXIS: 005
OD_CYLINDER: +1.25
OD_AXIS: 003
OS_SPHERE: +2.00
OS_CYLINDER: +1.00

## 2025-01-23 ASSESSMENT — SLIT LAMP EXAM - LIDS
COMMENTS: NORMAL
COMMENTS: NORMAL

## 2025-01-23 ASSESSMENT — TONOMETRY
IOP_METHOD: TONOPEN
OS_IOP_MMHG: 17
OD_IOP_MMHG: 19

## 2025-01-23 ASSESSMENT — VISUAL ACUITY
METHOD: SNELLEN - LINEAR
CORRECTION_TYPE: GLASSES
OS_CC: 20/20
OS_CC+: -2
OD_CC: 2/200E

## 2025-01-23 ASSESSMENT — CONF VISUAL FIELD
OD_INFERIOR_TEMPORAL_RESTRICTION: 1
OS_SUPERIOR_TEMPORAL_RESTRICTION: 0
OD_SUPERIOR_NASAL_RESTRICTION: 0
OS_SUPERIOR_NASAL_RESTRICTION: 0
METHOD: COUNTING FINGERS
OS_NORMAL: 1
OD_SUPERIOR_TEMPORAL_RESTRICTION: 2
OD_INFERIOR_NASAL_RESTRICTION: 1
OS_INFERIOR_TEMPORAL_RESTRICTION: 0
OS_INFERIOR_NASAL_RESTRICTION: 0

## 2025-01-23 ASSESSMENT — CUP TO DISC RATIO: OS_RATIO: 0.2

## 2025-01-23 ASSESSMENT — EXTERNAL EXAM - RIGHT EYE: OD_EXAM: NORMAL

## 2025-01-23 ASSESSMENT — EXTERNAL EXAM - LEFT EYE: OS_EXAM: NORMAL

## 2025-01-23 NOTE — NURSING NOTE
Chief Complaints and History of Present Illnesses   Patient presents with    Follow Up     Visit for Pallid optic disc edema, right eye / s/p TAB right side      Chief Complaint(s) and History of Present Illness(es)       Follow Up              Laterality: both eyes    Associated symptoms: flashes and floaters.  Negative for eye pain and headache    Pain scale: 0/10    Comments: Visit for Pallid optic disc edema, right eye / s/p TAB right side               Comments    Patient reports somewhat improved right eye, however other mornings he had floaters in the vision of right eye.  Patient reports occasional flashes.   POH: IOL's each eye  No gtts.   Shasta LOPEZ 12:50 PM January 23, 2025

## 2025-01-23 NOTE — LETTER
2025    RE: Lobito David  : 1935  MRN: [id number]    Dear Dr. Wang    Thank you for referring your patient, Lobito David, to my neuro-ophthalmology clinic recently.  After a thorough neuro-ophthalmic history and examination, I came to the following conclusions:   1. Arteritic anterior ischemic optic neuropathy, right eye, with severe vision loss.   2. Biopsy proven giant cell arteritis     Discussed with patient today that unfortunately it his right eye vision loss is permanent. Fortunately he does not have vision loss in the left eye.  Discussed that he will require treatment with high dose corticosteroids and that these will be tapered as Actemra is initiated.     3. Posterior capsular opacity in the right eye- dense- may be worthwhile undergoing posterior capsular opacity in the future although his ischemic optic neuropathy is certainly the primary etiology for his vision loss in the right eye.    Plan:  Rheumatology appointment 2025 with Dr. Medrano.   Continue prednisone at current dose of 60 mg daily. Recommend start Actemra if patient deemed to be a candidate.  Patient on GI prophylaxis and PJP prophylaxis and will continue    Follow-up with me in 2 months    Lobito David is a pleasant 89 year old White male who presents to my neuro-ophthalmology clinic today having been referred by Dr. 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     HPI:    Patient has a known history of stage III CKD, malignant neoplasm of urinary bladder neck, prostate cancer, Hx pulmonary embolism, hypertension, hyperlipidemia. He was first seen by the [facility] ophthalmology department in the ED after presenting from [facility] for evaluation of right optic disc edema.     Patient was recently hospitalized on 24-24 for extensive stroke workup at [facility]. States he developed sudden painless right  eye vision loss while driving on 12/20/24. This prompted ED visit the following day and the aforementioned hospital admission. Stroke work up came back unremarkable and the patient was discharged with close ophthalmology follow up. Was seen at [facility] where it was noted that he had right optic disc edema and macular edema. No treatment was initiated, however patient was given close follow up. He followed up with [facility] on 1/10/25, at which time fluorescein angiography testing was performed and demonstrated choroidal filling defects concerning for giant cell arteritis. Patient was told to present to [facility] ED for further evaluation.    He was subsequently admitted to the hospital on 1/10/25 for workup and steroids. He was started on 1g daily of IV solumedrol and underwent lab workup and a temporal artery biopsy. ESR was 72 and normal CRP and platelets. His TAB was performed 1/14/25 and pathology results were concerning for Giant cell arteritis.  Patient reported some subjective vision improvement following IV solumedrol. However bedside exam revealed Count fingers  vision at 1 foot in the right eye. He was discharged from the hospital on 1/13/25. He has since been taking prednisone oral 60 mg daily.     Today, patient reports some improvement in the right eye. He notes that some mornings he has floaters in the vision of right eye and reports occasional flashes. He denies any current unexplained fatigue, fevers, weight loss, joint pain or stiffness, new headaches, scalp tenderness, jaw claudication (tiredness with talking or chewing), or double vision.  He reports that around 4 weeks, ago, he did have a few episodes of horizontal double vision lasting around 1 minute.    Patient is scheduled for rheumatology appointment on 1/30/2025    Independent historians:  Patient and wife     Review of outside testing:    Temporal artery biopsy, right side (1/13/25):  Final Diagnosis   Temporal artery, right, biopsy: Loss of the  internal elastic lamina with scattered CD68-positive histiocytes suspicious for giant cell arteritis.       Erythrocyte Sedimentation Rate   Date Value Ref Range Status   01/10/2025 72 (H) 0 - 20 mm/hr Final     CRP Inflammation   Date Value Ref Range Status   01/10/2025 3.85 <5.00 mg/L Final     MRI orbits w/w/o contrast 1/10/24  IMPRESSION:   1.  Normal orbit MRI.  2.  No acute infarct.  3.  Chronic age-related intracranial white matter microvascular ischemic changes with mild/moderate volume loss.    MRI brain w/o contrast 12/22/24:   Impression:   1. No evidence of acute intracranial abnormality.   2. Mild generalized cerebral volume loss, and mild chronic microangiopathy changes.     CTA head 12/22/24:   IMPRESSION:   No intracranial proximal large vessel occlusion, flow-limiting luminal   stenosis, or cerebral aneurysm.     Review of outside clinical notes:    Ophthalmology progress note from Hospitalization starting 1/10/25:   Anterior ischemic optic neuropathy, RIGHT eye,  highly likely to be related to giant cell arteritis ( FA revealed choroidal filling defects from outside provider)     ESR 72 but CRP 3.85, Normal PLT level. ROS of GCA is also negative.      Status post temporal artery biopsy, results pending.      Plan:  IV Solumedrol for 3 days  OK to discharge per rheumatology after biopsy with 60 mg of prednisone  Follow up with neuro-ophthalmology service on 1/23/2024      MRI reviewed, I agree with the radiologist that there are no evidence of enhancement along the visual pathway.     Please direct all questions or concerns to the on-call ophthalmology resident.     Jeff Reis MD   Fellow, Neuro-ophthalmology      ASSESSMENT/PLAN:     Lobito David is a(n) 89 y.o. with a history of stage III CKD, malignant neoplasm of urinary bladder neck, prostate cancer, Hx pulmonary embolism, hypertension, hyperlipidemia. Patient presents from [facility] for evaluation of right  optic disc edema.     #Pallid optic disc edema, right eye  Va Hand motion right eye, 20/25 left eye, 3+ APD of the right eye, IOP 14/10, color vision 0/11 right eye. Anterior segment exam of the right eye notable for chronic appearing iris atrophy and dense posterior capsular opacification. Anterior segment exam of the left eye with no acute pathologic findings. Dilated exam of the right eye notable for disc pallor, inferior pallid disc edema with surrounding peripapillary flame hemorrhages. Right macula with blunted foveal light reflex and boggy appearance. Dilated exam of the left eye with pallorous disc appearance but no appreciable disc edema or heme. Broad differential of unilateral optic disc edema was considered including but not limited to atypical optic neuritis, compressive optic neuropathy, non-arteritic ischemic optic neuropathy, arteritic ischemic optic neuropathy/giant cell arteritis. At this time, given patients age, elevated ESR (72), patchy choroidal filling on fundus fluorescein angiography (can be seen in GCA/AAION) a diagnosis of AAION/GCA must be excluded.     1/11/2025: s/p 1 dose IV solumedrol. Patient with subjective improvement, CF at 1', unable to do color plates. Pupil exam unchanged. Left eye vision 20/20 and color vision full, no issues here. TAB planned for 1/13 as he needs his Xarelto held.     PLAN:  -1g daily of  IV solumedrol 3 - 5 doses  -Appreciate vascular surgery consult  -Rheumatology consult to help with pertinent rheumatologic assessment/workup  -Ophthalmology will follow closely will admitted  -Please contact ophthalmologist on call with any questions or concerns. We appreciate your care of this patient.     Discussed with , neuro-ophthalmology fellow, who agreed with this assessment and plan.      Ortiz Manjarrez MD on 1/11/2025 at 3:29 PM     Past medical history:    Patient Active Problem List   Diagnosis    S/P lumbar fusion    S/P total hip arthroplasty    Vision  loss of right eye   History of bladder cancer diagnosed around 2022 and surgically excised. No prior chemo or radiation.      Patient has a current medication list which includes the following prescription(s): aspirin, bisacodyl, calcium carbonate, chlorpromazine, famotidine, hydrochlorothiazide, metformin, pravastatin sodium, prednisone, sulfamethoxazole-trimethoprim, tamsulosin, tyenne, and vitamin d3.. List is confirmed today.    Family history / social history:  Patient's family history includes Cancer in his sister; Cardiovascular in his sister.     Patient  reports that he has quit smoking. He has never used smokeless tobacco. He reports current alcohol use. He reports that he does not use drugs.     Exam:  Visual acuity is 2/200 eccentric right eye and 20/20 -2 left eye.  Color vision decreased to 1 out of 11 right eye, normal left eye.  Pupil exam reveals APD of the right eye. Dilated fundus exam shows moderate pallor superiorly of right optic nerve head drusen with resolving peripapillary hemorrhage superiorly right eye. Left optic nerve head is normal appearing without pallor or edema or hemorrhage. Please see epic chart for complete exam    Tests ordered and interpreted today:    Zeiss 24-2 visual field  In the right eye: low vision protocol: dense nasal defects splitting fixation  In the left eye: reliable and full    OCT of the optic nerve head revealed   In the right eye: thickening inferiorly of retinal nerve fiber layer compared to age-matched controls   In the left eye: normal retinal nerve fiber layer compared to age-matched controls       Again, thank you for trusting me with the care of your patient.  For further exam details, please feel free to contact our office for additional records.  If you wish to contact me regarding this patient please email me at [email or web address] or give my clinic a call to arrange a phone conversation.    Sincerely,    Chandrakant Yao MD  ,  Neuro-Ophthalmology and Adult Strabismus Surgery  The Chel Edwards Chair in Neuro-Ophthalmology  Department of Ophthalmology and Visual Neurosciences  [facility]    DX: giant cell arteritis, arteritic anterior ischemic optic neuropathy

## 2025-01-27 ENCOUNTER — TELEPHONE (OUTPATIENT)
Dept: PHARMACY | Facility: CLINIC | Age: 89
End: 2025-01-27
Payer: COMMERCIAL

## 2025-01-27 DIAGNOSIS — M31.6 GIANT CELL ARTERITIS (H): ICD-10-CM

## 2025-01-27 RX ORDER — PREDNISONE 20 MG/1
TABLET ORAL
Qty: 60 TABLET | Refills: 1 | Status: SHIPPED | OUTPATIENT
Start: 2025-01-27

## 2025-01-27 RX ORDER — PREDNISONE 10 MG/1
TABLET ORAL
Qty: 30 TABLET | Refills: 3 | Status: SHIPPED | OUTPATIENT
Start: 2025-01-27

## 2025-01-27 NOTE — TELEPHONE ENCOUNTER
Called patient and his wife Jennifer to relay information about filling Tyenne:     The prescription order has been sent to your insurance's preferred specialty pharmacy, [facility] who will reach out to you to help set up your first shipment. You can also them at your convenience at [phone] to schedule your first shipment.     Patient's wife plans to call today to order. Reviewed prednisone taper once Tyenne is started. Notes they are nearly out of prednisone tablets. Will send in new prescriptions for 20 mg and 10 mg tablets so patient can start taper.    Carine Moseley, PharmD  Medication Therapy Management Pharmacist  [facility]

## 2025-01-30 ENCOUNTER — VIRTUAL VISIT (OUTPATIENT)
Dept: RHEUMATOLOGY | Facility: CLINIC | Age: 89
End: 2025-01-30
Attending: INTERNAL MEDICINE
Payer: COMMERCIAL

## 2025-01-30 VITALS
HEART RATE: 90 BPM | HEIGHT: 69 IN | BODY MASS INDEX: 27.4 KG/M2 | WEIGHT: 185 LBS | DIASTOLIC BLOOD PRESSURE: 83 MMHG | SYSTOLIC BLOOD PRESSURE: 154 MMHG

## 2025-01-30 DIAGNOSIS — Z96.641 STATUS POST TOTAL REPLACEMENT OF RIGHT HIP: ICD-10-CM

## 2025-01-30 DIAGNOSIS — M31.6 GCA (GIANT CELL ARTERITIS) (H): Primary | ICD-10-CM

## 2025-01-30 DIAGNOSIS — Z86.79 PERSONAL HISTORY OF OTHER DISEASES OF THE CIRCULATORY SYSTEM: ICD-10-CM

## 2025-01-30 PROCEDURE — 98007 SYNCH AUDIO-VIDEO EST HI 40: CPT | Performed by: INTERNAL MEDICINE

## 2025-01-30 PROCEDURE — G2211 COMPLEX E/M VISIT ADD ON: HCPCS | Performed by: INTERNAL MEDICINE

## 2025-01-30 RX ORDER — PHENOL 1.4 %
600 AEROSOL, SPRAY (ML) MUCOUS MEMBRANE 2 TIMES DAILY WITH MEALS
Qty: 180 TABLET | Refills: 1 | Status: SHIPPED | OUTPATIENT
Start: 2025-01-30

## 2025-01-30 ASSESSMENT — PAIN SCALES - GENERAL: PAINLEVEL_OUTOF10: NO PAIN (0)

## 2025-01-30 NOTE — LETTER
1/30/2025       RE: Lobito David  [address] [address]     Dear Colleague,    Thank you for referring your patient, Lobito David, to the [facility] [place] at [facility]. Please see a copy of my visit note below.    Virtual Visit Details    Joined the call at 1/30/2025, 4:13:47 pm.  Left the call at 1/30/2025, 4:36:21 pm.  Type of service:  Video Visit     Originating Location (pt. Location): Home  Distant Location (provider location):  Off-site  [facility]    DOS: 1/30/2025    Rheumatology New patient Visit Note    Reason for visit: GCA Dx 1/2025, follow up hospitalization        HPI from initial consult over the hospital 1/11/2025:      Lobito David is a 89 year old male admitted on 1/10/2025. 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      Patient reports intermittent episodes of blurry vision that started 2 months ago, and then he had one 6 weeks ago but at that time he did not notice any vision loss. He noticed vision loss 2 weeks ago when he was rubbing his left eye. At that time he noticed decrease vision acuity and blurry vision, he thinks that is has been almost the same since then. Denies any headaches, fever, weight loss, fatigue, jaw claudications, myalgias, stiffness or weakness.      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 He was started on pulse dose steroids with some improvement on his right eye vision changes.            Today 1/30/2025:    -doing well, no complaints except some flushing and frequent urination  since start of high dose prednisone    -R eye vision has not changed, still fuzzy    -no headaches or joint pain or CP    -just got tocilizumab inj, has not started it yet    Past Medical History:   Diagnosis Date     AAA (abdominal aortic aneurysm)      Acute diverticulitis      Arthritis      Erectile dysfunction      Hyperlipidemia      Hypertension      Low back pain      Shoulder disorder      Past Surgical History:   Procedure Laterality Date     ARTHROPLASTY HIP Right 5/21/2018    Procedure: ARTHROPLASTY HIP;  Right total hip arthroplasty;  Surgeon: Dale Mcghee MD;  Location: [facility] OR     BIOPSY ARTERY TEMPORAL Right 1/13/2025    Procedure: BIOPSY, ARTERY, TEMPORAL;  Surgeon: Thanh Neal MD;  Location: [facility] OR     COLONOSCOPY       OPTICAL TRACKING SYSTEM FUSION POSTERIOR SPINE LUMBAR N/A 2/2/2015    Procedure: OPTICAL TRACKING SYSTEM FUSION SPINE POSTERIOR LUMBAR ONE LEVEL;  Surgeon: Shakir Vasquez MD;  Location:  OR     ORTHOPEDIC SURGERY      femur knee, bilat     ORTHOPEDIC SURGERY      right shoulder arthroscopy     ORTHOPEDIC SURGERY Left     hip surgery, muscles tendon repair     Family History   Problem Relation Age of Onset     Cardiovascular Sister         cervical     Cancer Sister      Social History     Socioeconomic History     Marital status:      Spouse name: Not on file     Number of children: Not on file     Years of education: Not on file     Highest education level: Not on file   Occupational History     Not on file   Tobacco Use     Smoking status: Former     Smokeless tobacco: Never     Tobacco comments:     Quit 44 yrs ago   Vaping Use     Vaping status: Never Used   Substance and Sexual Activity     Alcohol use: Yes     Comment: rarely     Drug use: No     Sexual activity: Not on file   Other Topics Concern     Parent/sibling w/ CABG, MI or angioplasty before 65F 55M? Not Asked   Social History Narrative     Not on file     Social Drivers of Health     Financial  Resource Strain: Low Risk  (1/12/2025)    Financial Resource Strain      Within the past 12 months, have you or your family members you live with been unable to get utilities (heat, electricity) when it was really needed?: No   Food Insecurity: Low Risk  (1/12/2025)    Food Insecurity      Within the past 12 months, did you worry that your food would run out before you got money to buy more?: No      Within the past 12 months, did the food you bought just not last and you didn t have money to get more?: No   Transportation Needs: Low Risk  (1/12/2025)    Transportation Needs      Within the past 12 months, has lack of transportation kept you from medical appointments, getting your medicines, non-medical meetings or appointments, work, or from getting things that you need?: No   Physical Activity: Not on file   Stress: Not on file   Social Connections: Socially Integrated (12/22/2024)    Received from [facility]    Social Connections      Do you often feel lonely or isolated from those around you?: 0   Interpersonal Safety: Low Risk  (1/13/2025)    Interpersonal Safety      Do you feel physically and emotionally safe where you currently live?: Yes      Within the past 12 months, have you been hit, slapped, kicked or otherwise physically hurt by someone?: No      Within the past 12 months, have you been humiliated or emotionally abused in other ways by your partner or ex-partner?: No   Housing Stability: Low Risk  (1/12/2025)    Housing Stability      Do you have housing? : Yes      Are you worried about losing your housing?: No     Patient Active Problem List   Diagnosis     S/P lumbar fusion     S/P total hip arthroplasty     Vision loss of right eye     Allergies   Allergen Reactions     Lisinopril        Outpatient Encounter Medications as of 1/30/2025   Medication Sig Dispense Refill     aspirin 81 MG EC tablet Take 1 Tablet (81 mg) by mouth once daily with a meal.        "bisacodyl (DULCOLAX) 5 MG EC tablet Take 2 Tablets (10 mg) by mouth once daily if needed for Constipation.       calcium carbonate (OS-LISS) 600 MG tablet Take 1 tablet (600 mg) by mouth 2 times daily (with meals). 60 tablet 0     chlorproMAZINE (THORAZINE) 25 MG tablet Take 1-2 tablets (25-50 mg) by mouth 3 times daily as needed (hiccups) 15 tablet 0     famotidine (PEPCID) 10 MG tablet Take 1 tablet (10 mg) by mouth 2 times daily. 60 tablet 0     hydroCHLOROthiazide 12.5 MG tablet Take 12.5 mg by mouth daily.       metFORMIN (GLUCOPHAGE) 850 MG tablet Take 1 tablet (850 mg) by mouth daily (with dinner). 30 tablet 0     Pravastatin Sodium (PRAVACHOL PO) Take 10 mg by mouth every evening       predniSONE (DELTASONE) 10 MG tablet Take 50 mg daily x 1 week then 40 mg daily x 1 week then 35 mg daily x 1 week then 30 mg daily x 1 week then 25 mg daily x 1 week then 20 mg daily x 1 week then as directed 30 tablet 3     predniSONE (DELTASONE) 20 MG tablet Take 50 mg daily x 1 week then 40 mg daily x 1 week then 35 mg daily x 1 week then 30 mg daily x 1 week then 25 mg daily x 1 week then 20 mg daily x 1 week then as directed 60 tablet 1     sulfamethoxazole-trimethoprim (BACTRIM) 400-80 MG tablet Take 1 tablet daily three times weekly (Mon, Wed, Fri) 36 tablet 3     tamsulosin (FLOMAX) 0.4 MG capsule Take 0.4 mg by mouth daily.       Tocilizumab-aazg (TYENNE) 162 MG/0.9ML SOAJ Inject 162 mg subcutaneously once a week. 3.6 mL 5     Vitamin D3 (CHOLECALCIFEROL) 25 mcg (1000 units) tablet Take 1 tablet (25 mcg) by mouth daily. 30 tablet 1     No facility-administered encounter medications on file as of 1/30/2025.           ROS:  A comprehensive ROS was done, positives are per HPI.        Ph.E:    BP (!) 154/83   Pulse 90   Ht 1.753 m (5' 9\")   Wt 83.9 kg (185 lb)   BMI 27.32 kg/m        Constitutional: WD/WN. Pleasant. In no acute distress. Wife Jennifer present  Eyes: EOM intact, sclera anicteric, conj not injected  MS: No " synovitis. OA changes of the hands  Skin: No skin rash  Neuro: A&O x 3.   Psych: NL affect     Assessment/ plan:      GCA Dx 1/2025.       A/P 1/11/2025:       Mr. Lobito David is a 89 yo M. 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 He was started on pulse dose steroids with some improvement on his right eye vision changes.      He denies any other symptoms of GCA or PMR like temporal headaches or sensitivity, jaw claudication or bilateral shoulder or hip stiffness, pain or weakness. However, GCA is high in the differential. We agree with ophthalmology plan of pulse dose steroids and temporal artery. Will plan to start tocilizumab as outpatient  after biopsy results. If biopsy is negative other causes should be evaluated.      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 Medrano. Out clinic will contact patient.   -- Rheumatology will continue to follow        Today 1/30/2025:    TA biopsy was highly suggestive of GCA. Unfortunately, R vision loss seems to be permanent as it was ongoing for a while before Dx/Tx. Most recent labs showed stable anemia, GFR, nl ESR/CRP. Discussed Dx and Mx of GCA.  Discussed benefits/risks of prednisone, tocilizumab. Will do MRA of the chest to screen for GCA aortitis, MRA was chosen over CT to avoid using CT contrast given low GFR. Now on prednisone 60 mg qd.    Plan:    Start tocilizumab 162 mg sub q inj qwk    Prednisone taper as planned: Take 50 mg daily x 1 week then 40 mg daily x 1 week then 35 mg daily x 1 week then 30 mg daily x 1 week then 25 mg daily x 1 week then 20 mg daily x 1 week then as directed,     Bactrim qM/W/F, will stop when prednisone is below 20 mg qd    Calcium D    Close follow up with [facility]    Labs at the end of February    MRA [facility] the chest at [facility]    Return March 27 at 4 pm video visit (ABY slot could be used)     TT 50 min was spent on date of the encounter doing chart review, history and exam, documentation and further activities as noted above. Any prior notes, outside records, laboratory results, and imaging studies were reviewed if relevant. Answered questions that Mr. David and his wife Jennifer had. Advised them to defer questions about diabetes to diabetes provider.    The longitudinal plan of care for the diagnosis(es)/condition(s) as documented were addressed during this visit. Due to the added complexity in care, I will continue to support Lobito in the subsequent management and with ongoing continuity of care.        Orders Placed This Encounter   Procedures     MRA Chest with Contrast     Erythrocyte sedimentation rate auto     CRP inflammation     ALT     Albumin level     AST     Creatinine     CBC with Platelets & Differential            Mylene Medrano MD            Again, thank you for allowing me to participate in the care of your patient.      Sincerely,    Mylene Medrano MD

## 2025-01-30 NOTE — NURSING NOTE
Current patient location: [address] [place] [id number]    Is the patient currently in the state of [place]? YES    Visit mode: VIDEO    If the visit is dropped, the patient can be reconnected by:VIDEO VISIT: Send to e-mail at: [email or web address]    Will anyone else be joining the visit? Pts spouse  (If patient encounters technical issues they should call [phone]    Are changes needed to the allergy or medication list? No    Patients spouse denies any changes and states that all information remains accurate since last reviewed/verified.     Are refills needed on medications prescribed by this physician? NO, does not believe so     Rooming Documentation:  Questionnaire(s) completed    Reason for visit: REJI Martinez MA VVF

## 2025-01-30 NOTE — PROGRESS NOTES
Virtual Visit Details    Joined the call at 1/30/2025, 4:13:47 pm.  Left the call at 1/30/2025, 4:36:21 pm.  Type of service:  Video Visit     Originating Location (pt. Location): Home  Distant Location (provider location):  Off-site  Platform used for Video Visit: Kaylynn    DOS: 1/30/2025    Rheumatology New patient Visit Note    Reason for visit: GCA Dx 1/2025, follow up hospitalization        HPI from initial consult over the hospital 1/11/2025:      Lobito David is a 89 year old male admitted on 1/10/2025. 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      Patient reports intermittent episodes of blurry vision that started 2 months ago, and then he had one 6 weeks ago but at that time he did not notice any vision loss. He noticed vision loss 2 weeks ago when he was rubbing his left eye. At that time he noticed decrease vision acuity and blurry vision, he thinks that is has been almost the same since then. Denies any headaches, fever, weight loss, fatigue, jaw claudications, myalgias, stiffness or weakness.      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 He was started on pulse dose steroids with some improvement on his right eye vision changes.            Today 1/30/2025:    -doing well, no complaints except some flushing and frequent urination since start of high dose prednisone    -R eye vision has not changed, still fuzzy    -no headaches or joint pain or CP    -just got tocilizumab inj, has not started it yet    Past Medical History:   Diagnosis Date    AAA (abdominal aortic aneurysm)     Acute diverticulitis     Arthritis     Erectile dysfunction      Hyperlipidemia     Hypertension     Low back pain     Shoulder disorder      Past Surgical History:   Procedure Laterality Date    ARTHROPLASTY HIP Right 5/21/2018    Procedure: ARTHROPLASTY HIP;  Right total hip arthroplasty;  Surgeon: Dale Mcghee MD;  Location: [facility] OR    BIOPSY ARTERY TEMPORAL Right 1/13/2025    Procedure: BIOPSY, ARTERY, TEMPORAL;  Surgeon: Thanh Neal MD;  Location: [facility] OR    COLONOSCOPY      OPTICAL TRACKING SYSTEM FUSION POSTERIOR SPINE LUMBAR N/A 2/2/2015    Procedure: OPTICAL TRACKING SYSTEM FUSION SPINE POSTERIOR LUMBAR ONE LEVEL;  Surgeon: Shakir Vasquez MD;  Location:  OR    ORTHOPEDIC SURGERY      femur knee, bilat    ORTHOPEDIC SURGERY      right shoulder arthroscopy    ORTHOPEDIC SURGERY Left     hip surgery, muscles tendon repair     Family History   Problem Relation Age of Onset    Cardiovascular Sister         cervical    Cancer Sister      Social History     Socioeconomic History    Marital status:      Spouse name: Not on file    Number of children: Not on file    Years of education: Not on file    Highest education level: Not on file   Occupational History    Not on file   Tobacco Use    Smoking status: Former    Smokeless tobacco: Never    Tobacco comments:     Quit 44 yrs ago   Vaping Use    Vaping status: Never Used   Substance and Sexual Activity    Alcohol use: Yes     Comment: rarely    Drug use: No    Sexual activity: Not on file   Other Topics Concern    Parent/sibling w/ CABG, MI or angioplasty before 65F 55M? Not Asked   Social History Narrative    Not on file     Social Drivers of Health     Financial Resource Strain: Low Risk  (1/12/2025)    Financial Resource Strain     Within the past 12 months, have you or your family members you live with been unable to get utilities (heat, electricity) when it was really needed?: No   Food Insecurity: Low Risk  (1/12/2025)    Food Insecurity     Within the past 12 months, did you worry that your food would  run out before you got money to buy more?: No     Within the past 12 months, did the food you bought just not last and you didn t have money to get more?: No   Transportation Needs: Low Risk  (1/12/2025)    Transportation Needs     Within the past 12 months, has lack of transportation kept you from medical appointments, getting your medicines, non-medical meetings or appointments, work, or from getting things that you need?: No   Physical Activity: Not on file   Stress: Not on file   Social Connections: Socially Integrated (12/22/2024)    Received from [facility]    Social Connections     Do you often feel lonely or isolated from those around you?: 0   Interpersonal Safety: Low Risk  (1/13/2025)    Interpersonal Safety     Do you feel physically and emotionally safe where you currently live?: Yes     Within the past 12 months, have you been hit, slapped, kicked or otherwise physically hurt by someone?: No     Within the past 12 months, have you been humiliated or emotionally abused in other ways by your partner or ex-partner?: No   Housing Stability: Low Risk  (1/12/2025)    Housing Stability     Do you have housing? : Yes     Are you worried about losing your housing?: No     Patient Active Problem List   Diagnosis    S/P lumbar fusion    S/P total hip arthroplasty    Vision loss of right eye     Allergies   Allergen Reactions    Lisinopril        Outpatient Encounter Medications as of 1/30/2025   Medication Sig Dispense Refill    aspirin 81 MG EC tablet Take 1 Tablet (81 mg) by mouth once daily with a meal.      bisacodyl (DULCOLAX) 5 MG EC tablet Take 2 Tablets (10 mg) by mouth once daily if needed for Constipation.      calcium carbonate (OS-LISS) 600 MG tablet Take 1 tablet (600 mg) by mouth 2 times daily (with meals). 60 tablet 0    chlorproMAZINE (THORAZINE) 25 MG tablet Take 1-2 tablets (25-50 mg) by mouth 3 times daily as needed (hiccups) 15 tablet 0    famotidine (PEPCID)  "10 MG tablet Take 1 tablet (10 mg) by mouth 2 times daily. 60 tablet 0    hydroCHLOROthiazide 12.5 MG tablet Take 12.5 mg by mouth daily.      metFORMIN (GLUCOPHAGE) 850 MG tablet Take 1 tablet (850 mg) by mouth daily (with dinner). 30 tablet 0    Pravastatin Sodium (PRAVACHOL PO) Take 10 mg by mouth every evening      predniSONE (DELTASONE) 10 MG tablet Take 50 mg daily x 1 week then 40 mg daily x 1 week then 35 mg daily x 1 week then 30 mg daily x 1 week then 25 mg daily x 1 week then 20 mg daily x 1 week then as directed 30 tablet 3    predniSONE (DELTASONE) 20 MG tablet Take 50 mg daily x 1 week then 40 mg daily x 1 week then 35 mg daily x 1 week then 30 mg daily x 1 week then 25 mg daily x 1 week then 20 mg daily x 1 week then as directed 60 tablet 1    sulfamethoxazole-trimethoprim (BACTRIM) 400-80 MG tablet Take 1 tablet daily three times weekly (Mon, Wed, Fri) 36 tablet 3    tamsulosin (FLOMAX) 0.4 MG capsule Take 0.4 mg by mouth daily.      Tocilizumab-aazg (TYENNE) 162 MG/0.9ML SOAJ Inject 162 mg subcutaneously once a week. 3.6 mL 5    Vitamin D3 (CHOLECALCIFEROL) 25 mcg (1000 units) tablet Take 1 tablet (25 mcg) by mouth daily. 30 tablet 1     No facility-administered encounter medications on file as of 1/30/2025.           ROS:  A comprehensive ROS was done, positives are per HPI.        Ph.E:    BP (!) 154/83   Pulse 90   Ht 1.753 m (5' 9\")   Wt 83.9 kg (185 lb)   BMI 27.32 kg/m        Constitutional: WD/WN. Pleasant. In no acute distress. Wife Jennifer present  Eyes: EOM intact, sclera anicteric, conj not injected  MS: No synovitis. OA changes of the hands  Skin: No skin rash  Neuro: A&O x 3.   Psych: NL affect     Assessment/ plan:      GCA Dx 1/2025.       A/P 1/11/2025:       Mr. Lobito David is a 88 yo M. 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 He was started on pulse dose steroids with some improvement on his right eye vision changes.      He denies any other symptoms of GCA or PMR like temporal headaches or sensitivity, jaw claudication or bilateral shoulder or hip stiffness, pain or weakness. However, GCA is high in the differential. We agree with ophthalmology plan of pulse dose steroids and temporal artery. Will plan to start tocilizumab as outpatient  after biopsy results. If biopsy is negative other causes should be evaluated.      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 Medrano. Out clinic will contact patient.   -- Rheumatology will continue to follow        Today 1/30/2025:    TA biopsy was highly suggestive of GCA. Unfortunately, R vision loss seems to be permanent as it was ongoing for a while before Dx/Tx. Most recent labs showed stable anemia, GFR, nl ESR/CRP. Discussed Dx and Mx of GCA. Discussed benefits/risks of prednisone, tocilizumab. Will do MRA of the chest to screen for GCA aortitis, MRA was chosen over CT to avoid using CT contrast given low GFR. Now on prednisone 60 mg qd.    Plan:    Start tocilizumab 162 mg sub q inj qwk    Prednisone taper as planned: Take 50 mg daily x 1 week then 40 mg daily x 1 week then 35 mg daily x 1 week then 30 mg daily x 1 week then  25 mg daily x 1 week then 20 mg daily x 1 week then as directed,     Bactrim qM/W/F, will stop when prednisone is below 20 mg qd    Calcium D    Close follow up with MTM    Labs at the end of February    MRA of the chest at [facility]    Return March 27 at 4 pm video visit (ABY slot could be used)     TT 50 min was spent on date of the encounter doing chart review, history and exam, documentation and further activities as noted above. Any prior notes, outside records, laboratory results, and imaging studies were reviewed if relevant. Answered questions that Mr. David and his wife Jennifer had. Advised them to defer questions about diabetes to diabetes provider.    The longitudinal plan of care for the diagnosis(es)/condition(s) as documented were addressed during this visit. Due to the added complexity in care, I will continue to support Lobito in the subsequent management and with ongoing continuity of care.        Orders Placed This Encounter   Procedures    MRA Chest with Contrast    Erythrocyte sedimentation rate auto    CRP inflammation    ALT    Albumin level    AST    Creatinine    CBC with Platelets & Differential            Mylene Medrano MD

## 2025-01-30 NOTE — PATIENT INSTRUCTIONS
Labs at the end of February    MRA of the chest at [facility]    Return March 27 at 4 pm video visit (ABY slot could be used)

## 2025-02-06 ENCOUNTER — TELEPHONE (OUTPATIENT)
Dept: RHEUMATOLOGY | Facility: CLINIC | Age: 89
End: 2025-02-06
Payer: COMMERCIAL

## 2025-02-06 NOTE — TELEPHONE ENCOUNTER
----- Message from Mylene Medrano sent at 2/5/2025  1:01 PM CST -----  Regarding: RE: would you please help to set that up? thank you  Let's get that done here at [facility] on 3/26 when he comes to see his eye doctor, it is not urgent and he has been treated anyway. Thank you  ----- Message -----  From: Cory Gallo RN  Sent: 2/5/2025  12:50 PM CST  To: Mylene Medrano MD  Subject: FW: would you please help to set that up? th#    [facility] got back to me, they cannot do any cardiac imaging I guess. [facility] in [facility] could do it. They said it would be on a 3T machine both with and without contrast. Just making sure those criteria are ok with you.  ----- Message -----  From: Mylene Medrano MD  Sent: 1/30/2025   9:42 PM CST  To: Cory Gallo RN  Subject: would you please help to set that up? thank #    MRA of the chest at [facility]

## 2025-02-06 NOTE — TELEPHONE ENCOUNTER
Called and spoke with patient regarding need to schedule MRA. He is [place] and requests that his wife call us back when she is home later so she can speak with us.     Addendum   3:21 PM received call back from wife (previously ok'd by patient via phone). Explained need for MRA chest to wife. Unable to get this done at [facility]. Unable to get done at [place] until May. Wife was given imaging scheduling number. Advised that [place] and [place] have ability to complete test. She will call and reach out if any issues.         Maria C Gallo RN  Adult Rheumatology Clinic

## 2025-02-13 ENCOUNTER — VIRTUAL VISIT (OUTPATIENT)
Dept: PHARMACY | Facility: CLINIC | Age: 89
End: 2025-02-13
Attending: INTERNAL MEDICINE
Payer: COMMERCIAL

## 2025-02-13 DIAGNOSIS — M31.6 GIANT CELL ARTERITIS (H): Primary | ICD-10-CM

## 2025-02-13 DIAGNOSIS — I10 BENIGN ESSENTIAL HYPERTENSION: ICD-10-CM

## 2025-02-13 RX ORDER — AMLODIPINE BESYLATE 5 MG/1
1 TABLET ORAL DAILY
COMMUNITY
Start: 2025-02-10

## 2025-02-13 RX ORDER — FAMOTIDINE 10 MG
10 TABLET ORAL 2 TIMES DAILY
Qty: 60 TABLET | Refills: 5 | Status: SHIPPED | OUTPATIENT
Start: 2025-02-13

## 2025-02-13 NOTE — PROGRESS NOTES
Medication Therapy Management (MTM) Encounter    ASSESSMENT:                            Medication Adherence/Access: Will send famotidine refill to preferred pharmacy as requested.    Giant cell arteritis (GCA): Patient tolerating initial Tyenne injections well. Reviewed misfire - likely due to user error, unlikely this will happen again as patient and spouse now understand how to administer well. Reviewed prednisone taper through 10 mg dose - patient confirmed understanding through teachback. Would benefit from continuing medications as prescribed.    Hypertension: Patient is meeting BP goal of < 140/90mmHg per patient report.  Current treatment plan is effective, no change in therapy. Would benefit from continued follow up with PCP for management.    PLAN:                            1. Continue Tyenne 162 mg weekly. If you have another misfire please call [facility].    2. I sent in a refill of famotidine. Please pick this up from [facility] at your convenience.    3. Continue prednisone taper as follows:  40 mg daily x 1 week  35 mg daily x 1 week  30 mg daily x 1 week  25 mg daily x 1 week  20 mg daily x 1 week  15 mg daily x 1 week  12.5 mg daily x 2 weeks  10 mg daily x 1 week    4. Complete labs at the end of February as scheduled.     Follow-up: Return in 7 weeks (on 4/2/2025) for MTM Pharmacist Visit.    SUBJECTIVE/OBJECTIVE:                          Lobito David is a 89 year old male seen for a follow-up visit.       Reason for visit: Actemra follow up    Allergies/ADRs: Reviewed in chart  Past Medical History: Reviewed in chart  Tobacco: He reports that he has quit smoking. He has never used smokeless tobacco.  Alcohol: Less than 1 beverage / month - rarely drinks alcohol    Medication Adherence/Access: Needs refill of famotidine - notes bottle says no refills. Had a misfire with initial Tyenne pen. Talked with [facility] and does not need to get a replacement  pen as next refill is due soon enough to prevent a gap in treatment.    Giant cell arteritis (GCA):  Tyenne 162 mg weekly (Sundays)  Prednisone 40 mg daily  Famotidine 10 mg twice daily  Bactrim 400/80 mg three times weekly (M,W,F)   Calcium 600 mg twice daily   Vitamin D 1000 units twice daily      Patient reports he is doing well on current medications. Monitoring blood sugars and working with diabetes education through [facility] to manage steroid induced hyperglycemia. Discontinued metformin - was told this could worsen his kidney health and his blood sugars are doing well off medication. Started Tyenne injections on 2/2/25 - other than initial misfire administration has been going well. Has been tapering prednisone appropriately. No side effects or concerns noted. Planning on labs at the end of Feb at [facility].     Stone 2017 Accelerated prednisone taper:  60 mg daily x 1 week  50 mg daily x 1 week  40 mg daily x 1 week  35 mg daily x 1 week  30 mg daily x 1 week  25 mg daily x 1 week  20 mg daily x 1 week  15 mg daily x 1 week  12.5 mg daily x 2 weeks  10 mg daily x 1 week  9 mg daily x 1 week  8 mg daily x 1 week  7 mg daily x 1 week  6 mg daily x 2 weeks  5 mg daily x 2 weeks  4 mg daily x 2 weeks  3 mg daily x 2 weeks  2 mg daily x 2 weeks  1 mg daily x 2 weeks     CBC RESULTS:   Recent Labs   Lab Test 01/13/25  0648   WBC 8.8   RBC 3.17*   HGB 10.0*   HCT 30.0*   MCV 95   MCH 31.5   MCHC 33.3   RDW 12.9         Hypertension:  Amlodipine 5 mg daily  Aspirin 81 mg daily     Has been checking BP at home - since starting amlodipine readings have been under 140/90 mmHg. Does not have exact readings today. Notes he switched from hydrochlorothiazide to amlodipine after being told he couldn't be on hydrochlorothiazide - cannot find record of this statement in available notes. No side effects or concerns noted.    Today's Vitals: There were no vitals taken for this visit.  ----------------  Post Discharge  Medication Reconciliation Status: discharge medications reconciled, continue medications without change.    I spent 30 minutes with this patient today. All changes were made via collaborative practice agreement with Mylene Medrano MD.     A summary of these recommendations was sent via [employer].    Carine Moseley, PharmD  Medication Therapy Management Pharmacist  [facility]  Phone: [phone]    Telemedicine Visit Details  The patient's medications can be safely assessed via a telemedicine encounter.  Type of service:  Telephone visit  Originating Location (pt. Location): Home    Distant Location (provider location):  Off-site  Start Time: 9:30 AM  End Time: 10:00 AM     Medication Therapy Recommendations  No medication therapy recommendations to display

## 2025-02-13 NOTE — PATIENT INSTRUCTIONS
"Recommendations from today's MTM visit:                                                       1. Continue Tyenne 162 mg weekly. If you have another misfire please call [facility].    2. I sent in a refill of famotidine. Please pick this up from [facility] at your convenience.    3. Continue prednisone taper as follows:  40 mg daily x 1 week  35 mg daily x 1 week  30 mg daily x 1 week  25 mg daily x 1 week  20 mg daily x 1 week  15 mg daily x 1 week  12.5 mg daily x 2 weeks  10 mg daily x 1 week    4. Complete labs at the end of February as scheduled.     Follow-up: Return in 7 weeks (on 4/2/2025) for MTM Pharmacist Visit.    It was great speaking with you today.  I value your experience and would be very thankful for your time in providing feedback in our clinic survey. In the next few days, you may receive an email or text message from [employer] with a link to a survey related to your  clinical pharmacist.\"     To schedule another MTM appointment, please call the clinic directly or you may call the MTM scheduling line at [phone].    My Clinical Pharmacist's contact information:                                                      Please feel free to contact me with any questions or concerns you have.      Carine Moseley, Kvng  Medication Therapy Management Pharmacist  [facility]  Phone: [phone]     "

## 2025-02-17 ENCOUNTER — HOSPITAL ENCOUNTER (OUTPATIENT)
Dept: MRI IMAGING | Facility: CLINIC | Age: 89
Discharge: HOME | End: 2025-02-17
Attending: INTERNAL MEDICINE | Admitting: INTERNAL MEDICINE
Payer: COMMERCIAL

## 2025-02-17 DIAGNOSIS — Z86.79 PERSONAL HISTORY OF OTHER DISEASES OF THE CIRCULATORY SYSTEM: ICD-10-CM

## 2025-02-17 DIAGNOSIS — M31.6 GCA (GIANT CELL ARTERITIS) (H): ICD-10-CM

## 2025-02-17 PROCEDURE — A9585 GADOBUTROL INJECTION: HCPCS | Performed by: INTERNAL MEDICINE

## 2025-02-17 PROCEDURE — 71555 MRI ANGIO CHEST W OR W/O DYE: CPT

## 2025-02-17 PROCEDURE — 255N000002 HC RX 255 OP 636: Performed by: INTERNAL MEDICINE

## 2025-02-17 RX ORDER — GADOBUTROL 604.72 MG/ML
15 INJECTION INTRAVENOUS ONCE
Status: COMPLETED | OUTPATIENT
Start: 2025-02-17 | End: 2025-02-17

## 2025-02-17 RX ADMIN — GADOBUTROL 15 ML: 604.72 INJECTION INTRAVENOUS at 19:45

## 2025-02-21 ENCOUNTER — TRANSFERRED RECORDS (OUTPATIENT)
Dept: HEALTH INFORMATION MANAGEMENT | Facility: CLINIC | Age: 89
End: 2025-02-21
Payer: COMMERCIAL

## 2025-03-03 ENCOUNTER — VIRTUAL VISIT (OUTPATIENT)
Dept: PHARMACY | Facility: CLINIC | Age: 89
End: 2025-03-03
Attending: INTERNAL MEDICINE
Payer: COMMERCIAL

## 2025-03-03 DIAGNOSIS — M31.6 GIANT CELL ARTERITIS (H): Primary | ICD-10-CM

## 2025-03-05 NOTE — PROGRESS NOTES
Medication Therapy Management (MTM) Encounter    ASSESSMENT:                            Medication Adherence/Access: No issues identified.    Giant cell arteritis (GCA): Patient tolerating and finding good efficacy to medications. Reviewed recommendation to hold Tyenne while on antibiotics until symptoms start to improve. Discussed plan to hold Tyenne next Sunday and restart on 3/16. Recommend completing ENT visit as recommended by PCP due to lack of infection improvement on current antibiotics.    PLAN:                            1. Due to the ear infection, do not take your Tyenne on 3/9/25 and restart weekly doses on 3/16/25.    2. Make an appointment to see an ENT as planned.    Follow-up: Return in 30 days (on 4/2/2025) for MTM Pharmacist Visit.    SUBJECTIVE/OBJECTIVE:                          Lobito David is a 89 year old male seen for a follow-up visit. Patient was accompanied by his spouse Jennifer.      Reason for visit: Questions about recent ear infection, what to do with Tyenne during infections    Allergies/ADRs: Reviewed in chart  Past Medical History: Reviewed in chart  Tobacco: He reports that he has quit smoking. He has never used smokeless tobacco.  Alcohol: Less than 1 beverage / month    Medication Adherence/Access: no issues reported.    Giant cell arteritis (GCA):  Tyenne 162 mg weekly (Sundays)  Prednisone 25 mg daily  Famotidine 10 mg twice daily  Bactrim 400/80 mg three times weekly (M,W,F)   Calcium 600 mg twice daily   Vitamin D 1000 units twice daily      Patient reports he is doing well on current medications. Has developed an ear infection that he has seen PCP and gone to the ER for 3 times. Took a couple rounds of antibiotics - still having ear pain and fullness so recommended he see ENT. Would like to make sure this is the correct specialty for him to see and that this is not related to his GCA. Also wondering if he needs to adjust any of his medications due to this infection.     Stone  2017 Accelerated prednisone taper:  60 mg daily x 1 week  50 mg daily x 1 week  40 mg daily x 1 week  35 mg daily x 1 week  30 mg daily x 1 week  25 mg daily x 1 week  20 mg daily x 1 week  15 mg daily x 1 week  12.5 mg daily x 2 weeks  10 mg daily x 1 week  9 mg daily x 1 week  8 mg daily x 1 week  7 mg daily x 1 week  6 mg daily x 2 weeks  5 mg daily x 2 weeks  4 mg daily x 2 weeks  3 mg daily x 2 weeks  2 mg daily x 2 weeks  1 mg daily x 2 weeks     Today's Vitals: There were no vitals taken for this visit.  ----------------  Post Discharge Medication Reconciliation Status: discharge medications reconciled, continue medications without change.    I spent 18 minutes with this patient today. All changes were made via collaborative practice agreement with Mylene Medrano MD.     A summary of these recommendations was sent via [employer].    Julissa SadlerD  Medication Therapy Management Pharmacist  [facility]  Phone: [phone]    Telemedicine Visit Details  The patient's medications can be safely assessed via a telemedicine encounter.  Type of service:  Telephone visit  Originating Location (pt. Location): Home    Distant Location (provider location):  Off-site  Start Time: 2:30 PM  End Time: 2:48 PM     Medication Therapy Recommendations  Giant cell arteritis (H)   1 Current Medication: Tocilizumab-aazg (TYENNE) 162 MG/0.9ML SOAJ   Current Medication Sig: Inject 162 mg subcutaneously once a week.   Rationale: Does not understand instructions - Adherence - Adherence   Recommendation: Provide Education - Hold 3/9, restart 3/16 due to infection   Status: Patient Agreed - Adherence/Education   Identified Date: 3/3/2025 Completed Date: 3/3/2025

## 2025-03-07 NOTE — PATIENT INSTRUCTIONS
"Recommendations from today's MTM visit:                                                       1. Due to the ear infection, do not take your Tyenne on 3/9/25 and restart weekly doses on 3/16/25.    2. Make an appointment to see an ENT as planned.    Follow-up: Return in 30 days (on 4/2/2025) for MTM Pharmacist Visit.    It was great speaking with you today.  I value your experience and would be very thankful for your time in providing feedback in our clinic survey. In the next few days, you may receive an email or text message from [employer] with a link to a survey related to your  clinical pharmacist.\"     To schedule another MTM appointment, please call the clinic directly or you may call the MTM scheduling line at [phone].    My Clinical Pharmacist's contact information:                                                      Please feel free to contact me with any questions or concerns you have.      Carine Moseley, PharmD  Medication Therapy Management Pharmacist  [facility]  Phone: [phone]     "

## 2025-03-25 ENCOUNTER — TELEPHONE (OUTPATIENT)
Dept: RHEUMATOLOGY | Facility: CLINIC | Age: 89
End: 2025-03-25
Payer: COMMERCIAL

## 2025-03-25 NOTE — TELEPHONE ENCOUNTER
----- Message from Mylene Medrano sent at 3/25/2025  9:46 AM CDT -----  Regarding: RE: Hospice  He was on my schedule for Thu pm virtual and canceled, anyway he agrees to be put back on schedule Thu 4 pm slot? Still available. Then I could discuss pred taper with him. Thanks  ----- Message -----  From: Carine Moseley [facility]  Sent: 3/24/2025  11:18 AM CDT  To: Mylene Medrano MD  Subject: Hospice                                          Hey - Not sure if you were alerted to this or not. Kenny has had recurrent infections and was diagnosed with Severe sepsis (HC) due to uncomplicated diverticulitis and Orthostatic hypotension at [facility]. They sent him home on hospice and discontinued the tocilizumab. His wife Jennifer called me and he wants off all pills - she has been continuing to give him prednisone based on the taper we gave him (currently on 30 mg daily). Any adjustments we should make or tell her to keep going? He did electively stop the Bactrim so I am thinking we need to drop the prednisone to at least 20mg.    Thanks!  Carine

## 2025-03-25 NOTE — TELEPHONE ENCOUNTER
Called and spoke with patient who transferred me to his wife to discuss further. Per Jennifer, they are agreeable to re-schedule visit on 3/27 at 4pm virtual. Dr. Medrano will discuss pred taper plan as patient is on hospice and has stopped bactrim.       Maria C MCCORD RN  [facility]

## 2025-03-27 ENCOUNTER — VIRTUAL VISIT (OUTPATIENT)
Dept: RHEUMATOLOGY | Facility: CLINIC | Age: 89
End: 2025-03-27
Attending: INTERNAL MEDICINE
Payer: COMMERCIAL

## 2025-03-27 VITALS — WEIGHT: 170 LBS | HEIGHT: 69 IN | BODY MASS INDEX: 25.18 KG/M2

## 2025-03-27 DIAGNOSIS — M31.6 GCA (GIANT CELL ARTERITIS) (H): Primary | ICD-10-CM

## 2025-03-27 DIAGNOSIS — M31.6 GIANT CELL ARTERITIS (H): ICD-10-CM

## 2025-03-27 DIAGNOSIS — Z51.81 ENCOUNTER FOR MEDICATION MONITORING: ICD-10-CM

## 2025-03-27 PROCEDURE — G2211 COMPLEX E/M VISIT ADD ON: HCPCS | Performed by: INTERNAL MEDICINE

## 2025-03-27 PROCEDURE — 1126F AMNT PAIN NOTED NONE PRSNT: CPT | Performed by: INTERNAL MEDICINE

## 2025-03-27 PROCEDURE — 98006 SYNCH AUDIO-VIDEO EST MOD 30: CPT | Performed by: INTERNAL MEDICINE

## 2025-03-27 RX ORDER — PREDNISONE 1 MG/1
TABLET ORAL
Qty: 120 TABLET | Refills: 3 | Status: SHIPPED | OUTPATIENT
Start: 2025-03-27

## 2025-03-27 RX ORDER — PREDNISONE 10 MG/1
TABLET ORAL
Qty: 90 TABLET | Refills: 3 | Status: SHIPPED | OUTPATIENT
Start: 2025-03-27

## 2025-03-27 RX ORDER — PREDNISONE 5 MG/1
TABLET ORAL
Qty: 90 TABLET | Refills: 3 | Status: SHIPPED | OUTPATIENT
Start: 2025-03-27

## 2025-03-27 ASSESSMENT — PAIN SCALES - GENERAL: PAINLEVEL_OUTOF10: NO PAIN (0)

## 2025-03-27 NOTE — NURSING NOTE
Current patient location: [address] [place] [id number]    Is the patient currently in the state of [place]? NO    Visit mode: VIDEO    If the visit is dropped, the patient can be reconnected by:VIDEO VISIT: Send to e-mail at: [email or web address]    Will anyone else be joining the visit? NO  (If patient encounters technical issues they should call [phone]    Are changes needed to the allergy or medication list? Yes they lowered his Prednisone dosage     Are refills needed on medications prescribed by this physician? NO    Rooming Documentation:  Spoke with wife    Reason for visit: RECHECK    Magalis Medina VVF

## 2025-03-27 NOTE — PROGRESS NOTES
Virtual Visit Details    4 35 -4 55 pm      Type of service:  Video Visit     Originating Location (pt. Location): Home  Distant Location (provider location):  Off-site  Platform used for Video Visit: Kaylynn    Date of initial visit: 1/30/2025    DOS: 3/27/2025    Rheumatology Follow up Visit Note    Reason for visit: GCA Dx 1/2025, recent hospitalization for sepsis now off actemra        HPI from initial consult over the hospital 1/11/2025:      Lobito David is a 89 year old male admitted on 1/10/2025. 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      Patient reports intermittent episodes of blurry vision that started 2 months ago, and then he had one 6 weeks ago but at that time he did not notice any vision loss. He noticed vision loss 2 weeks ago when he was rubbing his left eye. At that time he noticed decrease vision acuity and blurry vision, he thinks that is has been almost the same since then. Denies any headaches, fever, weight loss, fatigue, jaw claudications, myalgias, stiffness or weakness.      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 He was started on pulse dose steroids with some improvement on his right eye vision changes.            1/30/2025:    -doing well, no complaints except some flushing and frequent urination since start of high dose prednisone    -R eye vision has not changed, still fuzzy    -no headaches or joint pain or CP    -just got tocilizumab inj, has not started it yet      Today 3/27/2025:    -recent hospitalization for sepsis, actemra was stopped, was enrolled in hospice, but now feeling much better since discharge    -on admission  was on prednisone 25 mg every day, which was increased to 30 mg every day, now back down to 25 mg every day    -no headaches, fevers or vision changes      Admission Date: 3/8/2025  Discharge Date: 3/19/2025     Discharge Plan: Lobito David was discharged to home hospice.     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  Monitor and treat accordingly.                     Past Medical History:   Diagnosis Date    AAA (abdominal aortic aneurysm)     Acute diverticulitis     Arthritis     Erectile dysfunction     Hyperlipidemia     Hypertension     Low back pain     Shoulder disorder      Past Surgical History:   Procedure Laterality Date    ARTHROPLASTY HIP Right 5/21/2018    Procedure: ARTHROPLASTY HIP;  Right total hip arthroplasty;  Surgeon: Dale Mcghee MD;  Location:  OR    BIOPSY ARTERY TEMPORAL Right 1/13/2025    Procedure: BIOPSY, ARTERY, TEMPORAL;  Surgeon: Thanh Neal MD;  Location: [facility] OR    COLONOSCOPY      OPTICAL TRACKING SYSTEM FUSION POSTERIOR SPINE LUMBAR N/A 2/2/2015    Procedure: OPTICAL TRACKING SYSTEM FUSION SPINE POSTERIOR LUMBAR ONE LEVEL;  Surgeon: Shakir Vasquez MD;  Location:  OR    ORTHOPEDIC SURGERY      femur knee, bilat    ORTHOPEDIC SURGERY      right shoulder arthroscopy    ORTHOPEDIC SURGERY Left     hip surgery, muscles tendon repair     Family History   Problem Relation Age of Onset    Cardiovascular Sister         cervical    Cancer Sister      Social History      Socioeconomic History    Marital status:      Spouse name: Not on file    Number of children: Not on file    Years of education: Not on file    Highest education level: Not on file   Occupational History    Not on file   Tobacco Use    Smoking status: Former     Passive exposure: Past    Smokeless tobacco: Never    Tobacco comments:     Quit 44 yrs ago   Vaping Use    Vaping status: Never Used   Substance and Sexual Activity    Alcohol use: Yes     Comment: rarely    Drug use: No    Sexual activity: Not on file   Other Topics Concern    Parent/sibling w/ CABG, MI or angioplasty before 65F 55M? Not Asked   Social History Narrative    Not on file     Social Drivers of Health     Financial Resource Strain: Low Risk  (1/12/2025)    Financial Resource Strain     Within the past 12 months, have you or your family members you live with been unable to get utilities (heat, electricity) when it was really needed?: No   Food Insecurity: No Food Insecurity (3/10/2025)    [email or web address]    Food Insecurity     Do you worry your food will run out before you are able to buy more?: 1   Transportation Needs: No Transportation Needs (3/10/2025)    Received from [facility]    Transportation Needs     Does lack of transportation keep you from medical appointments?: 1     Does lack of transportation keep you from work, meetings or getting things that you need?: 1   Physical Activity: Not on file   Stress: Not on file   Social Connections: Socially Integrated (3/10/2025)    Received from [facility]    Social Connections     Do you often feel lonely or isolated from those around you?: 0   Interpersonal Safety: Low Risk  (1/13/2025)    Interpersonal Safety     Do you feel physically and emotionally safe where you currently live?: Yes     Within the past 12 months, have you been hit, slapped, kicked or otherwise  physically hurt by someone?: No     Within the past 12 months, have you been humiliated or emotionally abused in other ways by your partner or ex-partner?: No   Housing Stability: Low Risk  (3/10/2025)    Received from [facility]    Housing Stability     What is your housing situation today?: 1     Patient Active Problem List   Diagnosis    S/P lumbar fusion    S/P total hip arthroplasty    Vision loss of right eye     Allergies   Allergen Reactions    Lisinopril        Outpatient Encounter Medications as of 3/27/2025   Medication Sig Dispense Refill    amLODIPine (NORVASC) 5 MG tablet Take 1 tablet by mouth daily.      aspirin 81 MG EC tablet Take 1 Tablet (81 mg) by mouth once daily with a meal.      bisacodyl (DULCOLAX) 5 MG EC tablet Take 2 Tablets (10 mg) by mouth once daily if needed for Constipation.      calcium carbonate (OS-LISS) 600 MG tablet Take 1 tablet (600 mg) by mouth 2 times daily (with meals). 180 tablet 1    famotidine (PEPCID) 10 MG tablet Take 1 tablet (10 mg) by mouth 2 times daily. 60 tablet 5    Pravastatin Sodium (PRAVACHOL PO) Take 10 mg by mouth every evening      predniSONE (DELTASONE) 10 MG tablet Take 50 mg daily x 1 week then 40 mg daily x 1 week then 35 mg daily x 1 week then 30 mg daily x 1 week then 25 mg daily x 1 week then 20 mg daily x 1 week then as directed 30 tablet 3    predniSONE (DELTASONE) 20 MG tablet Take 50 mg daily x 1 week then 40 mg daily x 1 week then 35 mg daily x 1 week then 30 mg daily x 1 week then 25 mg daily x 1 week then 20 mg daily x 1 week then as directed 60 tablet 1    sulfamethoxazole-trimethoprim (BACTRIM) 400-80 MG tablet Take 1 tablet daily three times weekly (Mon, Wed, Fri) 36 tablet 3    tamsulosin (FLOMAX) 0.4 MG capsule Take 0.4 mg by mouth daily.      Tocilizumab-aazg (TYENNE) 162 MG/0.9ML SOAJ Inject 162 mg subcutaneously once a week. 3.6 mL 5     No facility-administered encounter medications on file as of  "3/27/2025.           ROS:  A comprehensive ROS was done, positives are per HPI.        Ph.E:    Ht 1.753 m (5' 9\")   Wt 77.1 kg (170 lb)   BMI 25.10 kg/m        Constitutional: WD/WN. Pleasant. In no acute distress. Wife Jennifer present  Eyes: EOM intact, sclera anicteric, conj not injected  MS: No synovitis. OA changes of the hands  Skin: No skin rash  Neuro: A&O x 3.   Psych: NL affect     Assessment/ plan:      GCA Dx 1/2025.       A/P 1/11/2025:       Mr. Lobito David is a 88 yo M. 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 He was started on pulse dose steroids with some improvement on his right eye vision changes.      He denies any other symptoms of GCA or PMR like temporal headaches or sensitivity, jaw claudication or bilateral shoulder or hip stiffness, pain or weakness. However, GCA is high in the differential. We agree with ophthalmology plan of pulse dose steroids and temporal artery. Will plan to start tocilizumab as outpatient  after biopsy results. If biopsy is negative other causes should be evaluated.      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 Medrano. Out clinic will contact " patient.   -- Rheumatology will continue to follow        1/30/2025:    TA biopsy was highly suggestive of GCA. Unfortunately, R vision loss seems to be permanent as it was ongoing for a while before Dx/Tx. Most recent labs showed stable anemia, GFR, nl ESR/CRP. Discussed Dx and Mx of GCA. Discussed benefits/risks of prednisone, tocilizumab. Will do MRA of the chest to screen for GCA aortitis, MRA was chosen over CT to avoid using CT contrast given low GFR. Now on prednisone 60 mg qd.    Plan:    Start tocilizumab 162 mg sub q inj qwk    Prednisone taper as planned: Take 50 mg daily x 1 week then 40 mg daily x 1 week then 35 mg daily x 1 week then 30 mg daily x 1 week then 25 mg daily x 1 week then 20 mg daily x 1 week then as directed,     Bactrim qM/W/F, will stop when prednisone is below 20 mg qd    Calcium D    Close follow up with MTM    Labs at the end of February    MRA of the chest at [facility]    Return March 27 at 4 pm video visit (ABY slot could be used)      Today 3/27/2025:    Agree to hold off actemra given recent sepsis. Discussed traditional longer course of prednisone taper; risks were discussed.    Plan:    Taper prednisone as follow:   25 mg every day x 1 week  20 mg every day x 1 week  17.5 mg every day x 2 weeks  15 mg every day x 1 week  12.5 mg every day x 2 weeks  10 mg every day x 4 weeks  6-1-4-6-5-4-3-2-1 mg every day, each course x 4 weeks then stop    No actemra, no bactrim    Labs in 4 weeks then every 2 months    Return video visit in about 4-5 months     TT 30 min was spent on date of the encounter doing chart review, history and exam, documentation and further activities as noted above. Any prior notes, outside records, laboratory results, and imaging studies were reviewed if relevant. Answered questions that Mr. David and his wife Jennifer had. Advised them to defer questions about diabetes to diabetes provider.    The longitudinal plan of care for the  diagnosis(es)/condition(s) as documented were addressed during this visit. Due to the added complexity in care, I will continue to support Lobito in the subsequent management and with ongoing continuity of care.        Orders Placed This Encounter   Procedures    ALT    Albumin level    AST    Creatinine    ESR    CRP inflammation    CBC with Platelets & Differential         Mylene Medrano MD

## 2025-03-27 NOTE — PATIENT INSTRUCTIONS
Taper prednisone as follow:   25 mg every day x 1 week  20 mg every day x 1 week  17.5 mg every day x 2 weeks  15 mg every day x 1 week  12.5 mg every day x 2 weeks  10 mg every day x 4 weeks  9-4-5-6-5-4-3-2-1 mg every day, each course x 4 weeks then stop    No actemra, no bactrim    Labs in 4 weeks then every 2 months    Return video visit in about 4-5 months

## 2025-03-27 NOTE — LETTER
3/27/2025       RE: Lobito David  [address] [address]     Dear Colleague,    Thank you for referring your patient, Lobito David, to the [facility]. Please see a copy of my visit note below.    Virtual Visit Details    4 35 -4 55 pm      Type of service:  Video Visit     Originating Location (pt. Location): Home  Distant Location (provider location):  Off-site  Platform used for Video Visit: AmWell    Date of initial visit: 1/30/2025    DOS: 3/27/2025    Rheumatology Follow up Visit Note    Reason for visit: GCA Dx 1/2025, recent hospitalization for sepsis now off actemra        HPI from initial consult over the hospital 1/11/2025:      Lobito David is a 89 year old male admitted on 1/10/2025. 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      Patient reports intermittent episodes of blurry vision that started 2 months ago, and then he had one 6 weeks ago but at that time he did not notice any vision loss. He noticed vision loss 2 weeks ago when he was rubbing his left eye. At that time he noticed decrease vision acuity and blurry vision, he thinks that is has been almost the same since then. Denies any headaches, fever, weight loss, fatigue, jaw claudications, myalgias, stiffness or weakness.      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 He was started on pulse dose steroids with some improvement on his right eye vision changes.            1/30/2025:    -doing well, no complaints except some flushing and frequent urination since start of  high dose prednisone    -R eye vision has not changed, still fuzzy    -no headaches or joint pain or CP    -just got tocilizumab inj, has not started it yet      Today 3/27/2025:    -recent hospitalization for sepsis, actemra was stopped, was enrolled in hospice, but now feeling much better since discharge    -on admission was on prednisone 25 mg every day, which was increased to 30 mg every day, now back down to 25 mg every day    -no headaches, fevers or vision changes      Admission Date: 3/8/2025  Discharge Date: 3/19/2025     Discharge Plan: Lobito David was discharged to home hospice.     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  Monitor and treat accordingly.                     Past Medical History:   Diagnosis Date     AAA (abdominal aortic aneurysm)      Acute diverticulitis      Arthritis      Erectile dysfunction      Hyperlipidemia      Hypertension      Low back pain      Shoulder disorder      Past Surgical History:   Procedure Laterality Date     ARTHROPLASTY HIP Right 5/21/2018    Procedure: ARTHROPLASTY HIP;  Right total hip arthroplasty;  Surgeon: Dale Mcghee MD;  Location: [facility] OR     BIOPSY ARTERY TEMPORAL Right 1/13/2025    Procedure: BIOPSY, ARTERY, TEMPORAL;  Surgeon: Thanh Neal MD;  Location: [facility] OR     COLONOSCOPY       OPTICAL TRACKING SYSTEM FUSION POSTERIOR SPINE LUMBAR N/A 2/2/2015    Procedure: OPTICAL TRACKING SYSTEM FUSION SPINE POSTERIOR LUMBAR ONE LEVEL;  Surgeon:  Shakir Vasquez MD;  Location:  OR     ORTHOPEDIC SURGERY      femur knee, bilat     ORTHOPEDIC SURGERY      right shoulder arthroscopy     ORTHOPEDIC SURGERY Left     hip surgery, muscles tendon repair     Family History   Problem Relation Age of Onset     Cardiovascular Sister         cervical     Cancer Sister      Social History     Socioeconomic History     Marital status:      Spouse name: Not on file     Number of children: Not on file     Years of education: Not on file     Highest education level: Not on file   Occupational History     Not on file   Tobacco Use     Smoking status: Former     Passive exposure: Past     Smokeless tobacco: Never     Tobacco comments:     Quit 44 yrs ago   Vaping Use     Vaping status: Never Used   Substance and Sexual Activity     Alcohol use: Yes     Comment: rarely     Drug use: No     Sexual activity: Not on file   Other Topics Concern     Parent/sibling w/ CABG, MI or angioplasty before 65F 55M? Not Asked   Social History Narrative     Not on file     Social Drivers of Health     Financial Resource Strain: Low Risk  (1/12/2025)    Financial Resource Strain      Within the past 12 months, have you or your family members you live with been unable to get utilities (heat, electricity) when it was really needed?: No   Food Insecurity: No Food Insecurity (3/10/2025)    Received from [facility]    Food Insecurity      Do you worry your food will run out before you are able to buy more?: 1   Transportation Needs: No Transportation Needs (3/10/2025)    Received from [email or web address]    Transportation Needs      Does lack of transportation keep you from medical appointments?: 1      Does lack of transportation keep you from work, meetings or getting things that you need?: 1   Physical Activity: Not on file   Stress: Not on file   Social Connections: Socially Integrated (3/10/2025)    Received from [employer] [facility]    Social Connections      Do you often feel lonely or isolated from those around you?: 0   Interpersonal Safety: Low Risk  (1/13/2025)    Interpersonal Safety      Do you feel physically and emotionally safe where you currently live?: Yes      Within the past 12 months, have you been hit, slapped, kicked or otherwise physically hurt by someone?: No      Within the past 12 months, have you been humiliated or emotionally abused in other ways by your partner or ex-partner?: No   Housing Stability: Low Risk  (3/10/2025)    Received from [employer]    Housing Stability      What is your housing situation today?: 1     Patient Active Problem List   Diagnosis     S/P lumbar fusion     S/P total hip arthroplasty     Vision loss of right eye     Allergies   Allergen Reactions     Lisinopril        Outpatient Encounter Medications as of 3/27/2025   Medication Sig Dispense Refill     amLODIPine (NORVASC) 5 MG tablet Take 1 tablet by mouth daily.       aspirin 81 MG EC tablet Take 1 Tablet (81 mg) by mouth once daily with a meal.       bisacodyl (DULCOLAX) 5 MG EC tablet Take 2 Tablets (10 mg) by mouth once daily if needed for Constipation.       calcium carbonate (OS-LISS) 600 MG tablet Take 1 tablet (600 mg) by mouth 2 times daily (with meals). 180 tablet 1     famotidine (PEPCID) 10 MG tablet Take 1 tablet (10 mg) by mouth 2 times daily. 60 tablet 5     Pravastatin Sodium (PRAVACHOL PO) Take 10 mg by mouth every evening       predniSONE (DELTASONE) 10 MG tablet Take 50 mg daily x 1 week then 40 mg daily x 1 week then 35 mg daily x 1 week then 30 mg daily x 1 week then 25 mg daily x 1 week then 20 mg daily x 1 week then as directed 30 tablet 3     predniSONE (DELTASONE) 20 MG tablet Take 50 mg daily x 1 week then 40 mg daily x 1 week then 35 mg daily x 1 week then 30 mg daily x 1 week then 25 mg daily x 1 week then 20 mg daily x 1 week then as directed 60 tablet  "1     sulfamethoxazole-trimethoprim (BACTRIM) 400-80 MG tablet Take 1 tablet daily three times weekly (Mon, Wed, Fri) 36 tablet 3     tamsulosin (FLOMAX) 0.4 MG capsule Take 0.4 mg by mouth daily.       Tocilizumab-aazg (TYENNE) 162 MG/0.9ML SOAJ Inject 162 mg subcutaneously once a week. 3.6 mL 5     No facility-administered encounter medications on file as of 3/27/2025.           ROS:  A comprehensive ROS was done, positives are per HPI.        Ph.E:    Ht 1.753 m (5' 9\")   Wt 77.1 kg (170 lb)   BMI 25.10 kg/m        Constitutional: WD/WN. Pleasant. In no acute distress. Wife Jennifer present  Eyes: EOM intact, sclera anicteric, conj not injected  MS: No synovitis. OA changes of the hands  Skin: No skin rash  Neuro: A&O x 3.   Psych: NL affect     Assessment/ plan:      GCA Dx 1/2025.       A/P 1/11/2025:       Mr. Lobito David is a 88 yo M. 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 He was started on pulse dose steroids with some improvement on his right eye vision changes.      He denies any other symptoms of GCA or PMR like temporal headaches or sensitivity, jaw claudication or bilateral shoulder or hip stiffness, pain or weakness. However, GCA is high in the differential. We agree with ophthalmology plan of pulse dose steroids and temporal artery. Will plan to start tocilizumab as outpatient  after biopsy results. If biopsy is negative other causes should be evaluated.      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 Medrano. Out clinic will contact patient.   -- Rheumatology will continue to follow        1/30/2025:    TA biopsy was highly suggestive of GCA. Unfortunately, R vision loss seems to be permanent as it was ongoing for a while before Dx/Tx. Most recent labs showed stable anemia, GFR, nl ESR/CRP. Discussed Dx and Mx of GCA. Discussed benefits/risks of prednisone, tocilizumab. Will do MRA of the chest to screen for GCA aortitis, MRA was chosen over CT to avoid using CT contrast given low GFR. Now on prednisone 60 mg qd.    Plan:    Start tocilizumab 162 mg sub q inj qwk    Prednisone taper as planned: Take 50 mg daily x 1 week then 40 mg daily x 1 week then 35 mg daily x 1 week then 30 mg daily x 1 week then 25 mg daily x 1 week then 20 mg daily x 1 week then as directed,     Bactrim qM/W/F, will stop when prednisone is below 20 mg qd    Calcium D    Close follow up with MTM    Labs at the end of February    MRA of the chest at [facility]    Return March 27 at 4 pm video visit (ABY slot could be used)      Today 3/27/2025:    Agree to hold off actemra given recent sepsis. Discussed traditional longer course of prednisone taper; risks were discussed.    Plan:    Taper prednisone as follow:   25 mg every day x 1 week  20 mg every day x 1 week  17.5 mg every day x 2 weeks  15 mg every day x 1 week  12.5 mg every day x 2 weeks  10 mg every day x 4 weeks  6-0-1-6-5-4-3-2-1 mg every day, each course x 4 weeks then stop    No actemra, no bactrim    Labs in 4 weeks then every 2 months    Return video visit in about 4-5 months     TT 30 min was spent on date of the encounter doing chart review,  history and exam, documentation and further activities as noted above. Any prior notes, outside records, laboratory results, and imaging studies were reviewed if relevant. Answered questions that Mr. David and his wife Jennifer had. Advised them to defer questions about diabetes to diabetes provider.    The longitudinal plan of care for the diagnosis(es)/condition(s) as documented were addressed during this visit. Due to the added complexity in care, I will continue to support Lobito in the subsequent management and with ongoing continuity of care.        Orders Placed This Encounter   Procedures     ALT     Albumin level     AST     Creatinine     ESR     CRP inflammation     CBC with Platelets & Differential         Mylene Medrano MD            Again, thank you for allowing me to participate in the care of your patient.      Sincerely,    Mylene Medrano MD

## 2025-04-03 ENCOUNTER — TELEPHONE (OUTPATIENT)
Dept: RHEUMATOLOGY | Facility: CLINIC | Age: 89
End: 2025-04-03
Payer: COMMERCIAL

## 2025-04-03 NOTE — TELEPHONE ENCOUNTER
MTM appointment no showed, we made one more attempt to reschedule.     Routing back to referring provider and MTM Pharmacist Team    [facility]

## 2025-06-02 ENCOUNTER — VIRTUAL VISIT (OUTPATIENT)
Dept: PHARMACY | Facility: CLINIC | Age: 89
End: 2025-06-02
Attending: INTERNAL MEDICINE
Payer: COMMERCIAL

## 2025-06-02 DIAGNOSIS — M31.6 GIANT CELL ARTERITIS (H): Primary | ICD-10-CM

## 2025-06-04 NOTE — PROGRESS NOTES
"Medication Therapy Management (MTM) Encounter    ASSESSMENT:                            Medication Adherence/Access: {adherencechoices:468337}    ***:  ***      PLAN:                            ***    Follow-up: {followuptest2:990493}    SUBJECTIVE/OBJECTIVE:                          Lobito David is a 89 year old male seen for {mtmvisit:970439}     Reason for visit: ***.    Allergies/ADRs: {1/2/3/4/5:178004}  Past Medical History: {1/2/3/4/5:829953}  Tobacco: He reports that he has quit smoking. He has been exposed to tobacco smoke. He has never used smokeless tobacco.  Alcohol: {ALCOHOL CONSUMPTION HX:047339}  {Social and Goals:451981}    Medication Adherence/Access: {fumedadherence:445240}    ***:   ***    {MTM SUBJECTIVE (Optional):070793}      Today's Vitals: There were no vitals taken for this visit.  ----------------  {FIFI?:223215}    I spent {mtm total time 3:246019} with this patient today. { :512351}.     A summary of these recommendations {GIVEN/NOT GIVEN:445419}.    ***    Telemedicine Visit Details  The patient's medications can be safely assessed via a telemedicine encounter.  Type of service:  {telemedvisitmtm:232510::\"Telephone visit\"}  Originating Location (pt. Location): {video visit patient location:217008::\"Home\"}  {PROVIDER LOCATION On-site should be selected for visits conducted from your clinic location or adjoining [facility] hospital, academic office, or other nearby [facility] building. Off-site should be selected for all other provider locations, including home:543689}  Distant Location (provider location):  {virtual location provider:733390}  Start Time: {video/phone visit start time:152948}  End Time: {video/phone visit end time:152948}     Medication Therapy Recommendations  No medication therapy recommendations to display     " daily. No significant side effects noted - feels energy and stamina are good. Using an eye patch to minimize effect of vision loss.     Extended prednisone taper schedule:  10 mg daily x 4 weeks  9 mg daily x 4 weeks  8 mg daily x 4 weeks  7 mg daily x 4 weeks  6 mg daily x 4 weeks  5 mg daily x 4 weeks  4 mg daily x 4 weeks  3 mg daily x 4 weeks  2 mg daily x 4 weeks  1 mg daily x 4 weeks    Today's Vitals: There were no vitals taken for this visit.  ----------------    I spent 16 minutes with this patient today. All changes were made via collaborative practice agreement with Mylene Medrano.     A summary of these recommendations was sent via [employer].    Julissa SadlerD  Medication Therapy Management Pharmacist  [facility]  Phone: [phone]    Telemedicine Visit Details  The patient's medications can be safely assessed via a telemedicine encounter.  Type of service:  Telephone visit  Originating Location (pt. Location): Home    Distant Location (provider location):  Off-site  Start Time: 12:30 PM  End Time: 12:46 PM     Medication Therapy Recommendations  No medication therapy recommendations to display

## 2025-06-11 RX ORDER — MIDODRINE HYDROCHLORIDE 10 MG/1
10 TABLET ORAL 3 TIMES DAILY
COMMUNITY
Start: 2025-04-11

## 2025-06-11 RX ORDER — FLUDROCORTISONE ACETATE 0.1 MG/1
0.1 TABLET ORAL 2 TIMES DAILY
COMMUNITY
Start: 2025-05-14

## 2025-06-11 NOTE — PATIENT INSTRUCTIONS
"Recommendations from today's MTM visit:                                                       1. Continue prednisone taper and all other medications as prescribed. Please contact MTM if you have any questions about the taper instructions after taking over medication preparation and organization from the hospice team.    Follow-up: Return in about 6 months (around 12/2/2025) for MTM Pharmacist Visit.    It was great speaking with you today.  I value your experience and would be very thankful for your time in providing feedback in our clinic survey. In the next few days, you may receive an email or text message from [employer] with a link to a survey related to your  clinical pharmacist.\"     To schedule another MTM appointment, please call the clinic directly or you may call the MTM scheduling line at [phone].    My Clinical Pharmacist's contact information:                                                      Please feel free to contact me with any questions or concerns you have.      Carine Moseley, PharmD  Medication Therapy Management Pharmacist  [facility]  Phone: [phone]     "

## 2025-06-18 DIAGNOSIS — R10.13 EPIGASTRIC PAIN: ICD-10-CM

## 2025-06-20 ENCOUNTER — TELEPHONE (OUTPATIENT)
Dept: PHARMACY | Facility: CLINIC | Age: 89
End: 2025-06-20
Payer: COMMERCIAL

## 2025-06-20 DIAGNOSIS — M31.6 GCA (GIANT CELL ARTERITIS) (H): ICD-10-CM

## 2025-06-20 DIAGNOSIS — M31.6 GIANT CELL ARTERITIS (H): ICD-10-CM

## 2025-06-20 RX ORDER — PREDNISONE 5 MG/1
TABLET ORAL
Qty: 90 TABLET | Refills: 3 | Status: SHIPPED | OUTPATIENT
Start: 2025-06-20

## 2025-06-20 RX ORDER — PREDNISONE 1 MG/1
TABLET ORAL
Qty: 120 TABLET | Refills: 3 | Status: SHIPPED | OUTPATIENT
Start: 2025-06-20

## 2025-06-20 RX ORDER — FAMOTIDINE 10 MG
10 TABLET ORAL 2 TIMES DAILY
Qty: 60 TABLET | Refills: 5 | Status: SHIPPED | OUTPATIENT
Start: 2025-06-20

## 2025-06-20 RX ORDER — FAMOTIDINE 20 MG/1
20 TABLET, FILM COATED ORAL DAILY
Qty: 30 TABLET | Refills: 0 | OUTPATIENT
Start: 2025-06-20

## 2025-06-20 NOTE — TELEPHONE ENCOUNTER
Patient's wife Jennifer called MTM - patient took last dose of fludrocortisone this morning and pharmacy has no refills. Additionally has no refills for famotidine or prednisone.    Called pharmacy to confirm as clinic records note patient should have refills for another 2-3 months. Pharmacy reports patient was on hospice and was discharged. Hospice patients prescriptions are automatically transferred to hospice at start of care and not returned to the pharmacy if patient discharges. All new prescriptions needed for current medications. Will resend famotidine and prednisone prescriptions. Called PCP office about sending asap fludrocortisone prescription - high priority message sent to PCP by office staff.    Called patient's wife Jennifer again to update on situation. Unable to reach via phone - no VM available. Sent [employer] to update.

## 2025-06-20 NOTE — TELEPHONE ENCOUNTER
Disp Refills Start End MICHAEL   famotidine (PEPCID) 10 MG tablet 60 tablet 5 2/13/2025 -- No   Sig - Route: Take 1 tablet (10 mg) by mouth 2 times daily. - Oral     Wrong dose requested, refused

## 2025-08-27 ENCOUNTER — LAB (OUTPATIENT)
Dept: LAB | Facility: CLINIC | Age: 89
End: 2025-08-27
Attending: INTERNAL MEDICINE
Payer: COMMERCIAL

## 2025-08-27 DIAGNOSIS — Z51.81 ENCOUNTER FOR MEDICATION MONITORING: ICD-10-CM

## 2025-08-27 DIAGNOSIS — M31.6 GCA (GIANT CELL ARTERITIS) (H): ICD-10-CM

## 2025-08-27 LAB
BASOPHILS # BLD AUTO: <0.04 10E3/UL (ref 0–0.2)
BASOPHILS NFR BLD AUTO: 0.2 %
EOSINOPHIL # BLD AUTO: 0.04 10E3/UL (ref 0–0.7)
EOSINOPHIL NFR BLD AUTO: 0.6 %
ERYTHROCYTE [DISTWIDTH] IN BLOOD BY AUTOMATED COUNT: 12.7 % (ref 10–15)
ERYTHROCYTE [SEDIMENTATION RATE] IN BLOOD BY WESTERGREN METHOD: 9 MM/HR (ref 0–20)
HCT VFR BLD AUTO: 35.1 % (ref 40–53)
HGB BLD-MCNC: 11.5 G/DL (ref 13.3–17.7)
IMM GRANULOCYTES # BLD: 0.04 10E3/UL
IMM GRANULOCYTES NFR BLD: 0.6 %
LYMPHOCYTES # BLD AUTO: 2.14 10E3/UL (ref 0.8–5.3)
LYMPHOCYTES NFR BLD AUTO: 32.4 %
MCH RBC QN AUTO: 30.8 PG (ref 26.5–33)
MCHC RBC AUTO-ENTMCNC: 32.8 G/DL (ref 31.5–36.5)
MCV RBC AUTO: 94.1 FL (ref 78–100)
MONOCYTES # BLD AUTO: 0.33 10E3/UL (ref 0–1.3)
MONOCYTES NFR BLD AUTO: 5 %
NEUTROPHILS # BLD AUTO: 4.04 10E3/UL (ref 1.6–8.3)
NEUTROPHILS NFR BLD AUTO: 61.2 %
PLATELET # BLD AUTO: 130 10E3/UL (ref 150–450)
RBC # BLD AUTO: 3.73 10E6/UL (ref 4.4–5.9)
WBC # BLD AUTO: 6.6 10E3/UL (ref 4–11)

## 2025-08-27 PROCEDURE — 86140 C-REACTIVE PROTEIN: CPT

## 2025-08-27 PROCEDURE — 84460 ALANINE AMINO (ALT) (SGPT): CPT

## 2025-08-27 PROCEDURE — 36415 COLL VENOUS BLD VENIPUNCTURE: CPT

## 2025-08-27 PROCEDURE — 84450 TRANSFERASE (AST) (SGOT): CPT

## 2025-08-27 PROCEDURE — 82565 ASSAY OF CREATININE: CPT

## 2025-08-27 PROCEDURE — 82040 ASSAY OF SERUM ALBUMIN: CPT

## 2025-08-27 PROCEDURE — 85652 RBC SED RATE AUTOMATED: CPT

## 2025-08-27 PROCEDURE — 85025 COMPLETE CBC W/AUTO DIFF WBC: CPT

## 2025-08-28 LAB
ALBUMIN SERPL BCG-MCNC: 4.5 G/DL (ref 3.5–5.2)
ALT SERPL W P-5'-P-CCNC: 30 U/L (ref 0–70)
AST SERPL W P-5'-P-CCNC: 27 U/L (ref 0–45)
CREAT SERPL-MCNC: 1.25 MG/DL (ref 0.67–1.17)
CRP SERPL-MCNC: 3.98 MG/L
EGFRCR SERPLBLD CKD-EPI 2021: 56 ML/MIN/1.73M2

## (undated) DEVICE — SYR BULB IRRIG 50ML LATEX FREE 0035280

## (undated) DEVICE — SET HANDPIECE INTERPULSE W/COAXIAL FAN SPRAY TIP 0210118000

## (undated) DEVICE — ESU ELEC BLADE 6" COATED E1450-6

## (undated) DEVICE — GEL ULTRASOUND AQUASONIC 20GM 01-01

## (undated) DEVICE — IMM PILLOW ABDUCT HIP MED M60-025-M

## (undated) DEVICE — SU SILK 3-0 TIE 12X30" A304H

## (undated) DEVICE — LINEN HALF SHEET 5512

## (undated) DEVICE — SOL NACL 0.9% IRRIG 1000ML BOTTLE 2F7124

## (undated) DEVICE — NDL 30GA 0.5" 305106

## (undated) DEVICE — GOWN LG DISP 9515

## (undated) DEVICE — DRSG GAUZE 4X4" TRAY

## (undated) DEVICE — SU ETHIBOND 5 LRDA 30" B499T

## (undated) DEVICE — ESU ELEC BLADE E-SEP INSULATED NEPTUNE 70MM 0703-070-002

## (undated) DEVICE — BLADE SAW SAGITTAL STRK 25X90X1.27MM HD SYS 6 6125-127-090

## (undated) DEVICE — BAG CLEAR TRASH 1.3M 39X33" P4040C

## (undated) DEVICE — SOL NACL 0.9% IRRIG 1000ML BOTTLE 07138-09

## (undated) DEVICE — GLOVE PROTEXIS POWDER FREE 8.5 ORTHOPEDIC 2D73ET85

## (undated) DEVICE — LINEN TOWEL PACK X6 WHITE 5487

## (undated) DEVICE — SU VICRYL 1 CT-1 27" J341H

## (undated) DEVICE — SU SILK 2-0 TIE 12X30" A305H

## (undated) DEVICE — CATH TRAY FOLEY COUDE SURESTEP 16FR W/DRN BAG LATEX A304416A

## (undated) DEVICE — Device

## (undated) DEVICE — LINEN TOWEL PACK X5 5464

## (undated) DEVICE — PAD CHUX UNDERPAD 23X24" 7136

## (undated) DEVICE — GLOVE PROTEXIS POWDER FREE 8.0 ORTHOPEDIC 2D73ET80

## (undated) DEVICE — LINEN ORTHO ACL PACK 5447

## (undated) DEVICE — DRAPE SPLIT SHEET 77X108 REINFORCED 29436

## (undated) DEVICE — DRAIN HEMOVAC RESERVOIR KIT 10FR 1/8" MED 00-2550-002-10

## (undated) DEVICE — GLOVE PROTEXIS POWDER FREE 7.5 ORTHOPEDIC 2D73ET75

## (undated) DEVICE — PREP SKIN SCRUB TRAY 4461A

## (undated) DEVICE — SU VICRYL+ 3-0 27IN SH UND VCP416H

## (undated) DEVICE — ESU CORD BIPOLAR GREEN 10-4000

## (undated) DEVICE — LINEN DRAPE 54X72" 5467

## (undated) DEVICE — DRAPE STERI TOWEL SM 1000

## (undated) DEVICE — GLOVE PROTEXIS BLUE W/NEU-THERA 6.5  2D73EB65

## (undated) DEVICE — SU MONOCRYL 4-0 PS-2 27" UND Y426H

## (undated) DEVICE — PACK TOTAL HIP RIDGES LATEX PO15HIFSG

## (undated) DEVICE — SOL WATER IRRIG 1000ML BOTTLE 2F7114

## (undated) DEVICE — DRAPE CONVERTORS U-DRAPE 60X72" 8476

## (undated) DEVICE — PREP POVIDONE-IODINE 7.5% SCRUB 4OZ BOTTLE MDS093945

## (undated) DEVICE — ESU GROUND PAD ADULT W/CORD E7507

## (undated) DEVICE — SU DERMABOND ADVANCED .7ML DNX12

## (undated) DEVICE — GOWN XLG XLONG DISP LF DYNJP2302P

## (undated) DEVICE — LINEN FULL SHEET 5511

## (undated) DEVICE — SYRINGE EAR/ULCER STERILE 2 OZ BULB 4172

## (undated) DEVICE — SUCTION MANIFOLD NEPTUNE 2 SYS 4 PORT 0702-020-000

## (undated) DEVICE — LABEL MEDICATION SYSTEM 3303-P

## (undated) DEVICE — SU VICRYL 2-0 CT-1 27" UND J259H

## (undated) DEVICE — PITCHER STERILE 1000ML  SSK9004A

## (undated) DEVICE — DEVICE RETRIEVER HEWSON 71111579

## (undated) DEVICE — GLOVE PROTEXIS BLUE W/NEU-THERA 7.0  2D73EB70

## (undated) DEVICE — DRSG AQUACEL AG 3.5X9.75" HYDROFIBER 412011

## (undated) DEVICE — PREP POVIDONE-IODINE 10% SOLUTION 4OZ BOTTLE MDS093944

## (undated) DEVICE — DRAPE POUCH INSTRUMENT 1018

## (undated) DEVICE — GLOVE PROTEXIS W/NEU-THERA 8.5  2D73TE85

## (undated) DEVICE — GLOVE PROTEXIS MICRO 6.0  2D73PM60

## (undated) DEVICE — PREP CHLORAPREP 26ML TINTED ORANGE  260815

## (undated) RX ORDER — ACETAMINOPHEN 325 MG/1
TABLET ORAL
Status: DISPENSED
Start: 2025-01-13

## (undated) RX ORDER — PROPOFOL 10 MG/ML
INJECTION, EMULSION INTRAVENOUS
Status: DISPENSED
Start: 2018-05-21

## (undated) RX ORDER — DEXAMETHASONE SODIUM PHOSPHATE 4 MG/ML
INJECTION, SOLUTION INTRA-ARTICULAR; INTRALESIONAL; INTRAMUSCULAR; INTRAVENOUS; SOFT TISSUE
Status: DISPENSED
Start: 2025-01-13

## (undated) RX ORDER — FENTANYL CITRATE 50 UG/ML
INJECTION, SOLUTION INTRAMUSCULAR; INTRAVENOUS
Status: DISPENSED
Start: 2018-05-21

## (undated) RX ORDER — BUPIVACAINE HYDROCHLORIDE 2.5 MG/ML
INJECTION, SOLUTION EPIDURAL; INFILTRATION; INTRACAUDAL
Status: DISPENSED
Start: 2025-01-13

## (undated) RX ORDER — CEFAZOLIN SODIUM/WATER 2 G/20 ML
SYRINGE (ML) INTRAVENOUS
Status: DISPENSED
Start: 2025-01-13

## (undated) RX ORDER — VASOPRESSIN 20 U/ML
INJECTION PARENTERAL
Status: DISPENSED
Start: 2018-05-21

## (undated) RX ORDER — CEFAZOLIN SODIUM 2 G/100ML
INJECTION, SOLUTION INTRAVENOUS
Status: DISPENSED
Start: 2018-05-21

## (undated) RX ORDER — LIDOCAINE HYDROCHLORIDE 10 MG/ML
INJECTION, SOLUTION EPIDURAL; INFILTRATION; INTRACAUDAL; PERINEURAL
Status: DISPENSED
Start: 2018-05-21

## (undated) RX ORDER — FENTANYL CITRATE-0.9 % NACL/PF 10 MCG/ML
PLASTIC BAG, INJECTION (ML) INTRAVENOUS
Status: DISPENSED
Start: 2025-01-13

## (undated) RX ORDER — CEFAZOLIN SODIUM 1 G/3ML
INJECTION, POWDER, FOR SOLUTION INTRAMUSCULAR; INTRAVENOUS
Status: DISPENSED
Start: 2018-05-21

## (undated) RX ORDER — LIDOCAINE HYDROCHLORIDE AND EPINEPHRINE 10; 10 MG/ML; UG/ML
INJECTION, SOLUTION INFILTRATION; PERINEURAL
Status: DISPENSED
Start: 2025-01-13

## (undated) RX ORDER — PROPOFOL 10 MG/ML
INJECTION, EMULSION INTRAVENOUS
Status: DISPENSED
Start: 2025-01-13

## (undated) RX ORDER — ONDANSETRON 2 MG/ML
INJECTION INTRAMUSCULAR; INTRAVENOUS
Status: DISPENSED
Start: 2025-01-13

## (undated) RX ORDER — EPHEDRINE SULFATE 50 MG/ML
INJECTION, SOLUTION INTRAMUSCULAR; INTRAVENOUS; SUBCUTANEOUS
Status: DISPENSED
Start: 2018-05-21

## (undated) RX ORDER — PHENYLEPHRINE HCL IN 0.9% NACL 1 MG/10 ML
SYRINGE (ML) INTRAVENOUS
Status: DISPENSED
Start: 2018-05-21